# Patient Record
Sex: MALE | Race: WHITE | NOT HISPANIC OR LATINO | URBAN - METROPOLITAN AREA
[De-identification: names, ages, dates, MRNs, and addresses within clinical notes are randomized per-mention and may not be internally consistent; named-entity substitution may affect disease eponyms.]

---

## 2021-01-08 ENCOUNTER — INPATIENT (INPATIENT)
Facility: HOSPITAL | Age: 66
LOS: 3 days | Discharge: LEFT BEFORE DISCHARGE | DRG: 177 | End: 2021-01-12
Attending: INTERNAL MEDICINE | Admitting: INTERNAL MEDICINE
Payer: COMMERCIAL

## 2021-01-08 VITALS
WEIGHT: 185.19 LBS | TEMPERATURE: 99 F | DIASTOLIC BLOOD PRESSURE: 98 MMHG | RESPIRATION RATE: 20 BRPM | OXYGEN SATURATION: 87 % | HEART RATE: 101 BPM | SYSTOLIC BLOOD PRESSURE: 161 MMHG

## 2021-01-08 DIAGNOSIS — Z29.9 ENCOUNTER FOR PROPHYLACTIC MEASURES, UNSPECIFIED: ICD-10-CM

## 2021-01-08 DIAGNOSIS — R09.02 HYPOXEMIA: ICD-10-CM

## 2021-01-08 DIAGNOSIS — U07.1 COVID-19: ICD-10-CM

## 2021-01-08 LAB
ALBUMIN SERPL ELPH-MCNC: 3.4 G/DL — LOW (ref 3.5–5)
ALP SERPL-CCNC: 40 U/L — SIGNIFICANT CHANGE UP (ref 40–120)
ALT FLD-CCNC: 44 U/L DA — SIGNIFICANT CHANGE UP (ref 10–60)
ANION GAP SERPL CALC-SCNC: 10 MMOL/L — SIGNIFICANT CHANGE UP (ref 5–17)
AST SERPL-CCNC: 34 U/L — SIGNIFICANT CHANGE UP (ref 10–40)
BASOPHILS # BLD AUTO: 0 K/UL — SIGNIFICANT CHANGE UP (ref 0–0.2)
BASOPHILS NFR BLD AUTO: 0 % — SIGNIFICANT CHANGE UP (ref 0–2)
BILIRUB SERPL-MCNC: 0.4 MG/DL — SIGNIFICANT CHANGE UP (ref 0.2–1.2)
BUN SERPL-MCNC: 11 MG/DL — SIGNIFICANT CHANGE UP (ref 7–18)
CALCIUM SERPL-MCNC: 8.8 MG/DL — SIGNIFICANT CHANGE UP (ref 8.4–10.5)
CHLORIDE SERPL-SCNC: 96 MMOL/L — SIGNIFICANT CHANGE UP (ref 96–108)
CO2 SERPL-SCNC: 29 MMOL/L — SIGNIFICANT CHANGE UP (ref 22–31)
CREAT SERPL-MCNC: 0.97 MG/DL — SIGNIFICANT CHANGE UP (ref 0.5–1.3)
D DIMER BLD IA.RAPID-MCNC: 162 NG/ML DDU — SIGNIFICANT CHANGE UP
EOSINOPHIL # BLD AUTO: 0.01 K/UL — SIGNIFICANT CHANGE UP (ref 0–0.5)
EOSINOPHIL NFR BLD AUTO: 0.2 % — SIGNIFICANT CHANGE UP (ref 0–6)
GLUCOSE SERPL-MCNC: 113 MG/DL — HIGH (ref 70–99)
HCT VFR BLD CALC: 42 % — SIGNIFICANT CHANGE UP (ref 39–50)
HGB BLD-MCNC: 14 G/DL — SIGNIFICANT CHANGE UP (ref 13–17)
IMM GRANULOCYTES NFR BLD AUTO: 0.2 % — SIGNIFICANT CHANGE UP (ref 0–1.5)
LYMPHOCYTES # BLD AUTO: 0.78 K/UL — LOW (ref 1–3.3)
LYMPHOCYTES # BLD AUTO: 19 % — SIGNIFICANT CHANGE UP (ref 13–44)
MCHC RBC-ENTMCNC: 30.6 PG — SIGNIFICANT CHANGE UP (ref 27–34)
MCHC RBC-ENTMCNC: 33.3 GM/DL — SIGNIFICANT CHANGE UP (ref 32–36)
MCV RBC AUTO: 91.9 FL — SIGNIFICANT CHANGE UP (ref 80–100)
MONOCYTES # BLD AUTO: 0.47 K/UL — SIGNIFICANT CHANGE UP (ref 0–0.9)
MONOCYTES NFR BLD AUTO: 11.4 % — SIGNIFICANT CHANGE UP (ref 2–14)
NEUTROPHILS # BLD AUTO: 2.84 K/UL — SIGNIFICANT CHANGE UP (ref 1.8–7.4)
NEUTROPHILS NFR BLD AUTO: 69.2 % — SIGNIFICANT CHANGE UP (ref 43–77)
NRBC # BLD: 0 /100 WBCS — SIGNIFICANT CHANGE UP (ref 0–0)
PLATELET # BLD AUTO: 168 K/UL — SIGNIFICANT CHANGE UP (ref 150–400)
POTASSIUM SERPL-MCNC: 3.7 MMOL/L — SIGNIFICANT CHANGE UP (ref 3.5–5.3)
POTASSIUM SERPL-SCNC: 3.7 MMOL/L — SIGNIFICANT CHANGE UP (ref 3.5–5.3)
PROT SERPL-MCNC: 8 G/DL — SIGNIFICANT CHANGE UP (ref 6–8.3)
RBC # BLD: 4.57 M/UL — SIGNIFICANT CHANGE UP (ref 4.2–5.8)
RBC # FLD: 12.4 % — SIGNIFICANT CHANGE UP (ref 10.3–14.5)
SARS-COV-2 RNA SPEC QL NAA+PROBE: DETECTED
SODIUM SERPL-SCNC: 135 MMOL/L — SIGNIFICANT CHANGE UP (ref 135–145)
TROPONIN I SERPL-MCNC: <0.015 NG/ML — SIGNIFICANT CHANGE UP (ref 0–0.04)
WBC # BLD: 4.11 K/UL — SIGNIFICANT CHANGE UP (ref 3.8–10.5)
WBC # FLD AUTO: 4.11 K/UL — SIGNIFICANT CHANGE UP (ref 3.8–10.5)

## 2021-01-08 PROCEDURE — 71045 X-RAY EXAM CHEST 1 VIEW: CPT | Mod: 26

## 2021-01-08 PROCEDURE — 93010 ELECTROCARDIOGRAM REPORT: CPT

## 2021-01-08 PROCEDURE — 99284 EMERGENCY DEPT VISIT MOD MDM: CPT

## 2021-01-08 RX ORDER — ACETAMINOPHEN 500 MG
650 TABLET ORAL EVERY 4 HOURS
Refills: 0 | Status: DISCONTINUED | OUTPATIENT
Start: 2021-01-08 | End: 2021-01-12

## 2021-01-08 RX ORDER — IPRATROPIUM/ALBUTEROL SULFATE 18-103MCG
3 AEROSOL WITH ADAPTER (GRAM) INHALATION EVERY 6 HOURS
Refills: 0 | Status: DISCONTINUED | OUTPATIENT
Start: 2021-01-08 | End: 2021-01-09

## 2021-01-08 RX ORDER — GUAIFENESIN/DEXTROMETHORPHAN 600MG-30MG
10 TABLET, EXTENDED RELEASE 12 HR ORAL EVERY 4 HOURS
Refills: 0 | Status: DISCONTINUED | OUTPATIENT
Start: 2021-01-08 | End: 2021-01-12

## 2021-01-08 RX ORDER — DEXAMETHASONE 0.5 MG/5ML
6 ELIXIR ORAL ONCE
Refills: 0 | Status: COMPLETED | OUTPATIENT
Start: 2021-01-08 | End: 2021-01-08

## 2021-01-08 RX ORDER — PANTOPRAZOLE SODIUM 20 MG/1
40 TABLET, DELAYED RELEASE ORAL
Refills: 0 | Status: DISCONTINUED | OUTPATIENT
Start: 2021-01-08 | End: 2021-01-12

## 2021-01-08 RX ORDER — ENOXAPARIN SODIUM 100 MG/ML
40 INJECTION SUBCUTANEOUS DAILY
Refills: 0 | Status: DISCONTINUED | OUTPATIENT
Start: 2021-01-08 | End: 2021-01-12

## 2021-01-08 RX ORDER — AMLODIPINE BESYLATE 2.5 MG/1
5 TABLET ORAL DAILY
Refills: 0 | Status: DISCONTINUED | OUTPATIENT
Start: 2021-01-08 | End: 2021-01-12

## 2021-01-08 RX ORDER — TIOTROPIUM BROMIDE 18 UG/1
1 CAPSULE ORAL; RESPIRATORY (INHALATION) DAILY
Refills: 0 | Status: DISCONTINUED | OUTPATIENT
Start: 2021-01-08 | End: 2021-01-12

## 2021-01-08 RX ORDER — ALBUTEROL 90 UG/1
2 AEROSOL, METERED ORAL EVERY 4 HOURS
Refills: 0 | Status: DISCONTINUED | OUTPATIENT
Start: 2021-01-08 | End: 2021-01-12

## 2021-01-08 RX ORDER — DEXAMETHASONE 0.5 MG/5ML
6 ELIXIR ORAL DAILY
Refills: 0 | Status: DISCONTINUED | OUTPATIENT
Start: 2021-01-08 | End: 2021-01-12

## 2021-01-08 RX ADMIN — Medication 6 MILLIGRAM(S): at 17:47

## 2021-01-08 RX ADMIN — AMLODIPINE BESYLATE 5 MILLIGRAM(S): 2.5 TABLET ORAL at 21:37

## 2021-01-08 NOTE — H&P ADULT - ATTENDING COMMENTS
PATIENT SEEN AND EXAMINED. CASE D/W ER MD AND RESIDENT TEAM. ABOVE ROS/VS/PE REVIEWED AND VERIFIED,    HPI:    Patient is 65 year old male from home with no PMHx presenting to the ED with worsening shortness of breath for the last 4-5 days. He reports that today he went to an urgent care and was tested positive for COVID-19. Patient with worsening dyspnea on exertion and states that the shortness of breath has worsened NOW. pt on NR in ed and confortable. Pt denies and nausea, vomiting diarrhoea, chest pain, difficulty breathing.    # ACUTE HYPOXIC RESPIRATORY FAILURE S/T COVID19 VIRAL PNEUMONIA - PLACED ON SUPPLEMENTAL OXYGEN VIA NRB. F/U COVID19 IGG, F/U INFLAMMATORY MARKERS, PLACED ON DECADRON, THERAPEUTIC LOVENOX, AIRBORNE/DROPLET PRECAUTIONS  - F/U PROCAL - PRIOR TO STARTING ABX  - PULMONOLOGY CONSULT TO BE PLACED  # GI AND DVT PPX    RAMÍREZ CLARK MD COVERING LILY CLARK MD PATIENT SEEN AND EXAMINED. CASE D/W ER MD AND RESIDENT TEAM. ABOVE ROS/VS/PE REVIEWED AND VERIFIED,    HPI:    Patient is 65 year old male from home with no PMHx presenting to the ED with worsening shortness of breath for the last 4-5 days. He reports that today he went to an urgent care and was tested positive for COVID-19. Patient with worsening dyspnea on exertion and states that the shortness of breath has worsened NOW. pt on NR in ed and confortable. Pt denies and nausea, vomiting diarrhoea, chest pain, difficulty breathing.    # ACUTE HYPOXIC RESPIRATORY FAILURE S/T COVID19 VIRAL PNEUMONIA - PLACED ON SUPPLEMENTAL OXYGEN VIA NRB. F/U COVID19 IGG, F/U INFLAMMATORY MARKERS, PLACED ON DECADRON, THERAPEUTIC LOVENOX, TYLENOL, ROBITUSSIN, ALBUTEROL MDI, AIRBORNE/DROPLET PRECAUTIONS  - F/U PROCAL - PRIOR TO STARTING ABX  - PULMONOLOGY CONSULT TO BE PLACED  # GI AND DVT PPX    RAMÍREZ CLARK MD COVERING LILY CLARK MD

## 2021-01-08 NOTE — ED ADULT NURSE NOTE - ED STAT RN HANDOFF DETAILS
No sign of acute distress noted. Safety precaution maintained. Pt is transferred to  in stable condition.

## 2021-01-08 NOTE — ED PROVIDER NOTE - CLINICAL SUMMARY MEDICAL DECISION MAKING FREE TEXT BOX
65 year old male presenting to the ED with worsening shortness of breath. Concern for COVID-19 pneumonia with hypoxia. In need of supplemental O2. Will order labs, steroids, and reassess for admission.

## 2021-01-08 NOTE — H&P ADULT - PROBLEM SELECTOR PLAN 1
Likely Severe COVID Infection:  acute hypoxic respiratory failure 2/2 covid  -SIRS Criteria= 2 (, tachycardia and tachypnea) + covid source of infection  -Chest xray=  lungs show subtle/peripheral/RIGHT greater than LEFT ill-defined airspace disease. No pneumothorax  -on exam mild-to-moderate distress, tachypneic and requiring supplemental oxygen.  Therefore, patient is categorized as Severe Disease.  -Will start patient on therapeutic Lovenox  - will continue patient on Dexamethasone 6mg IV daily for 10 days or until discharge (whichever is shorter)  - Duonebs Q6H LAWRENCE as well as Albuterol MDI Q2H PRN  -Will hold antimicrobials, for now, though will still send ESR, CRP, Procalcitonin  - follow d-dimer, CRP, LDH, Troponin, Ferritin, CPK  -Robitussin 100mg Syrup Q6H PRN  -Tylenol PRN for fever  - supportive care as necessary  - will hold Remdesivir for now, f/u covid antibodies

## 2021-01-08 NOTE — ED ADULT NURSE NOTE - OBJECTIVE STATEMENT
presents with c/o shortness of breath on/off cough no fever/chills noted. presents with c/o shortness of breath onset 5days  on/off cough no fever/chills noted. stated tested Covid + today

## 2021-01-08 NOTE — ED PROVIDER NOTE - RESPIRATORY, MLM
O2 saturation at 87% on RA but goes up to 99 with 10L of O2. Breath sounds clear and equal bilaterally.

## 2021-01-08 NOTE — ED PROVIDER NOTE - OBJECTIVE STATEMENT
65 year old male denies any PMHx presenting to the ED with worsening shortness of breath for the last 4-5 days. She reports that today she went to an urgent care and was tested positive for COVID-19. Patient with worsening dyspnea on exertion and states that the shortness of breath has worsened.

## 2021-01-08 NOTE — H&P ADULT - NSHPPHYSICALEXAM_GEN_ALL_CORE
PHYSICAL EXAM:  GENERAL: NAD, speaks in full sentences, on NR  HEAD:  Atraumatic, Normocephalic  EYES: EOMI, PERRLA, conjunctiva and sclera clear  NECK: Supple, No JVD  CHEST/LUNG: DECREASED BREATH SOUNDS ON auscultation bilaterally; No wheeze; No crackles; No accessory muscles used  HEART: Regular rate and rhythm; No murmurs;   ABDOMEN: Soft, Nontender, Nondistended; Bowel sounds present; No guarding  EXTREMITIES:  2+ Peripheral Pulses, No cyanosis or edema  PSYCH: AAOx3  NEUROLOGY: no-focal DEFICIT  SKIN: No rashes or lesions

## 2021-01-08 NOTE — H&P ADULT - HISTORY OF PRESENT ILLNESS
65 year old male denies any PMHx presenting to the ED with worsening shortness of breath for the last 4-5 days. She reports that today she went to an urgent care and was tested positive for COVID-19. Patient with worsening dyspnea on exertion and states that the shortness of breath has worsened. Patient is 65 year old male from home with no PMHx presenting to the ED with worsening shortness of breath for the last 4-5 days. He reports that today he went to an urgent care and was tested positive for COVID-19. Patient with worsening dyspnea on exertion and states that the shortness of breath has worsened NOW. pt on NR in ed and confortable. Pt denies and nausea, vomiting diarrhoea, chest pain, difficulty breathing.

## 2021-01-08 NOTE — H&P ADULT - ASSESSMENT
Patient is 65 year old male from home with no PMHx presenting to the ED with worsening shortness of breath for the last 4-5 days. He reports that today he went to an urgent care and was tested positive for COVID-19.  aDMITTED FOR acute hypoxic respiratory failure

## 2021-01-09 DIAGNOSIS — I10 ESSENTIAL (PRIMARY) HYPERTENSION: ICD-10-CM

## 2021-01-09 LAB
A1C WITH ESTIMATED AVERAGE GLUCOSE RESULT: 5.8 % — HIGH (ref 4–5.6)
ALBUMIN SERPL ELPH-MCNC: 3.3 G/DL — LOW (ref 3.5–5)
ALP SERPL-CCNC: 39 U/L — LOW (ref 40–120)
ALT FLD-CCNC: 48 U/L DA — SIGNIFICANT CHANGE UP (ref 10–60)
AMMONIA BLD-MCNC: 37 UMOL/L — HIGH (ref 11–32)
ANION GAP SERPL CALC-SCNC: 9 MMOL/L — SIGNIFICANT CHANGE UP (ref 5–17)
AST SERPL-CCNC: 37 U/L — SIGNIFICANT CHANGE UP (ref 10–40)
BASOPHILS # BLD AUTO: 0 K/UL — SIGNIFICANT CHANGE UP (ref 0–0.2)
BASOPHILS NFR BLD AUTO: 0 % — SIGNIFICANT CHANGE UP (ref 0–2)
BILIRUB SERPL-MCNC: 0.3 MG/DL — SIGNIFICANT CHANGE UP (ref 0.2–1.2)
BUN SERPL-MCNC: 18 MG/DL — SIGNIFICANT CHANGE UP (ref 7–18)
CALCIUM SERPL-MCNC: 9.1 MG/DL — SIGNIFICANT CHANGE UP (ref 8.4–10.5)
CHLORIDE SERPL-SCNC: 103 MMOL/L — SIGNIFICANT CHANGE UP (ref 96–108)
CHOLEST SERPL-MCNC: 126 MG/DL — SIGNIFICANT CHANGE UP
CO2 SERPL-SCNC: 27 MMOL/L — SIGNIFICANT CHANGE UP (ref 22–31)
CREAT SERPL-MCNC: 0.76 MG/DL — SIGNIFICANT CHANGE UP (ref 0.5–1.3)
CRP SERPL-MCNC: 9.21 MG/DL — HIGH (ref 0–0.4)
CRP SERPL-MCNC: 9.61 MG/DL — HIGH (ref 0–0.4)
D DIMER BLD IA.RAPID-MCNC: 153 NG/ML DDU — SIGNIFICANT CHANGE UP
EOSINOPHIL # BLD AUTO: 0 K/UL — SIGNIFICANT CHANGE UP (ref 0–0.5)
EOSINOPHIL NFR BLD AUTO: 0 % — SIGNIFICANT CHANGE UP (ref 0–6)
ERYTHROCYTE [SEDIMENTATION RATE] IN BLOOD: 75 MM/HR — HIGH (ref 0–20)
ESTIMATED AVERAGE GLUCOSE: 120 MG/DL — HIGH (ref 68–114)
ETHANOL SERPL-MCNC: <3 MG/DL — SIGNIFICANT CHANGE UP (ref 0–10)
FERRITIN SERPL-MCNC: 312 NG/ML — SIGNIFICANT CHANGE UP (ref 30–400)
FERRITIN SERPL-MCNC: 350 NG/ML — SIGNIFICANT CHANGE UP (ref 30–400)
FOLATE SERPL-MCNC: 19.7 NG/ML — SIGNIFICANT CHANGE UP
GLUCOSE SERPL-MCNC: 137 MG/DL — HIGH (ref 70–99)
HCT VFR BLD CALC: 41.8 % — SIGNIFICANT CHANGE UP (ref 39–50)
HCV AB S/CO SERPL IA: 0.08 S/CO — SIGNIFICANT CHANGE UP (ref 0–0.99)
HCV AB SERPL-IMP: SIGNIFICANT CHANGE UP
HDLC SERPL-MCNC: 37 MG/DL — LOW
HGB BLD-MCNC: 14.1 G/DL — SIGNIFICANT CHANGE UP (ref 13–17)
IRON SATN MFR SERPL: 15 % — LOW (ref 20–55)
IRON SATN MFR SERPL: 39 UG/DL — LOW (ref 65–170)
LDH SERPL L TO P-CCNC: 314 U/L — HIGH (ref 120–225)
LIDOCAIN IGE QN: 269 U/L — SIGNIFICANT CHANGE UP (ref 73–393)
LIPID PNL WITH DIRECT LDL SERPL: 68 MG/DL — SIGNIFICANT CHANGE UP
LYMPHOCYTES # BLD AUTO: 0.58 K/UL — LOW (ref 1–3.3)
LYMPHOCYTES # BLD AUTO: 23 % — SIGNIFICANT CHANGE UP (ref 13–44)
MAGNESIUM SERPL-MCNC: 2.4 MG/DL — SIGNIFICANT CHANGE UP (ref 1.6–2.6)
MANUAL SMEAR VERIFICATION: SIGNIFICANT CHANGE UP
MCHC RBC-ENTMCNC: 30.9 PG — SIGNIFICANT CHANGE UP (ref 27–34)
MCHC RBC-ENTMCNC: 33.7 GM/DL — SIGNIFICANT CHANGE UP (ref 32–36)
MCV RBC AUTO: 91.5 FL — SIGNIFICANT CHANGE UP (ref 80–100)
MONOCYTES # BLD AUTO: 0.23 K/UL — SIGNIFICANT CHANGE UP (ref 0–0.9)
MONOCYTES NFR BLD AUTO: 9 % — SIGNIFICANT CHANGE UP (ref 2–14)
NEUTROPHILS # BLD AUTO: 1.71 K/UL — LOW (ref 1.8–7.4)
NEUTROPHILS NFR BLD AUTO: 68 % — SIGNIFICANT CHANGE UP (ref 43–77)
NON HDL CHOLESTEROL: 89 MG/DL — SIGNIFICANT CHANGE UP
NRBC # BLD: 0 /100 — SIGNIFICANT CHANGE UP (ref 0–0)
PHOSPHATE SERPL-MCNC: 3.9 MG/DL — SIGNIFICANT CHANGE UP (ref 2.5–4.5)
PLAT MORPH BLD: NORMAL — SIGNIFICANT CHANGE UP
PLATELET # BLD AUTO: 158 K/UL — SIGNIFICANT CHANGE UP (ref 150–400)
POTASSIUM SERPL-MCNC: 3.9 MMOL/L — SIGNIFICANT CHANGE UP (ref 3.5–5.3)
POTASSIUM SERPL-SCNC: 3.9 MMOL/L — SIGNIFICANT CHANGE UP (ref 3.5–5.3)
PROCALCITONIN SERPL-MCNC: 0.14 NG/ML — HIGH (ref 0.02–0.1)
PROCALCITONIN SERPL-MCNC: 0.14 NG/ML — HIGH (ref 0.02–0.1)
PROT SERPL-MCNC: 8.1 G/DL — SIGNIFICANT CHANGE UP (ref 6–8.3)
RBC # BLD: 4.57 M/UL — SIGNIFICANT CHANGE UP (ref 4.2–5.8)
RBC # BLD: 4.57 M/UL — SIGNIFICANT CHANGE UP (ref 4.2–5.8)
RBC # FLD: 12.3 % — SIGNIFICANT CHANGE UP (ref 10.3–14.5)
RBC BLD AUTO: NORMAL — SIGNIFICANT CHANGE UP
RETICS #: 21.9 K/UL — LOW (ref 25–125)
RETICS/RBC NFR: 0.5 % — SIGNIFICANT CHANGE UP (ref 0.5–2.5)
SODIUM SERPL-SCNC: 139 MMOL/L — SIGNIFICANT CHANGE UP (ref 135–145)
T4 AB SER-ACNC: 10.3 UG/DL — SIGNIFICANT CHANGE UP (ref 4.6–12)
TIBC SERPL-MCNC: 262 UG/DL — SIGNIFICANT CHANGE UP (ref 250–450)
TRIGL SERPL-MCNC: 106 MG/DL — SIGNIFICANT CHANGE UP
TROPONIN I SERPL-MCNC: <0.015 NG/ML — SIGNIFICANT CHANGE UP (ref 0–0.04)
TSH SERPL-MCNC: 0.4 UU/ML — SIGNIFICANT CHANGE UP (ref 0.34–4.82)
UIBC SERPL-MCNC: 223 UG/DL — SIGNIFICANT CHANGE UP (ref 110–370)
URATE SERPL-MCNC: 4.1 MG/DL — SIGNIFICANT CHANGE UP (ref 3.4–8.8)
VIT B12 SERPL-MCNC: 506 PG/ML — SIGNIFICANT CHANGE UP (ref 232–1245)
WBC # BLD: 2.52 K/UL — LOW (ref 3.8–10.5)
WBC # FLD AUTO: 2.52 K/UL — LOW (ref 3.8–10.5)

## 2021-01-09 RX ORDER — ASPIRIN/CALCIUM CARB/MAGNESIUM 324 MG
81 TABLET ORAL DAILY
Refills: 0 | Status: DISCONTINUED | OUTPATIENT
Start: 2021-01-09 | End: 2021-01-12

## 2021-01-09 RX ORDER — FERROUS SULFATE 325(65) MG
325 TABLET ORAL DAILY
Refills: 0 | Status: DISCONTINUED | OUTPATIENT
Start: 2021-01-09 | End: 2021-01-12

## 2021-01-09 RX ADMIN — PANTOPRAZOLE SODIUM 40 MILLIGRAM(S): 20 TABLET, DELAYED RELEASE ORAL at 06:13

## 2021-01-09 RX ADMIN — Medication 325 MILLIGRAM(S): at 13:17

## 2021-01-09 RX ADMIN — Medication 6 MILLIGRAM(S): at 06:13

## 2021-01-09 RX ADMIN — ENOXAPARIN SODIUM 40 MILLIGRAM(S): 100 INJECTION SUBCUTANEOUS at 13:16

## 2021-01-09 RX ADMIN — AMLODIPINE BESYLATE 5 MILLIGRAM(S): 2.5 TABLET ORAL at 06:13

## 2021-01-09 NOTE — PROGRESS NOTE ADULT - PROBLEM SELECTOR PLAN 2
RISK                                                          Points  [] Previous VTE                                           3  [] Thrombophilia                                        2  [] Lower limb paralysis                              2   [] Current Cancer                                       2   [x] Immobilization > 24 hrs                        1  [] ICU/CCU stay > 24 hours                       1  [x] Age > 60                                                   1  DVT ppx: Subq Lovenox  GI ppx: Protonix  Diet: Regular  Electrolytes replaced PRN  Dispo: Home  FULL CODE Started on amlodipine for elevated BPs  Monitor BP titrate meds prn

## 2021-01-09 NOTE — PROGRESS NOTE ADULT - PROBLEM SELECTOR PLAN 3
RISK                                                          Points  [] Previous VTE                                           3  [] Thrombophilia                                        2  [] Lower limb paralysis                              2   [] Current Cancer                                       2   [x] Immobilization > 24 hrs                        1  [] ICU/CCU stay > 24 hours                       1  [x] Age > 60                                                   1  DVT ppx: Subq Lovenox  GI ppx: Protonix  Diet: Regular  Electrolytes replaced PRN  Dispo: Home  FULL CODE

## 2021-01-09 NOTE — PROGRESS NOTE ADULT - SUBJECTIVE AND OBJECTIVE BOX
Patient is a 65y old  Male who presents with a chief complaint of acute hypoxic respiratory failure (2021 20:54)    PATIENT IS SEEN AND EXAMINED IN MEDICAL FLOOR.  MAKENZIE [    ]    MORRIS [   ]      GT [   ]    ALLERGIES:  No Known Drug Allergies  salicylates causes rash (Rash)      Daily     Daily Weight in k.3 (2021 00:02)    VITALS:    Vital Signs Last 24 Hrs  T(C): 36.2 (2021 05:36), Max: 37 (2021 17:02)  T(F): 97.2 (2021 05:36), Max: 98.6 (2021 17:02)  HR: 75 (2021 05:36) (75 - 101)  BP: 154/78 (2021 05:36) (136/77 - 161/98)  BP(mean): --  RR: 16 (2021 05:36) (16 - 20)  SpO2: 96% (2021 05:36) (87% - 96%)    LABS:    CBC Full  -  ( 2021 08:22 )  WBC Count : 2.52 K/uL  RBC Count : 4.57 M/uL  Hemoglobin : 14.1 g/dL  Hematocrit : 41.8 %  Platelet Count - Automated : 158 K/uL  Mean Cell Volume : 91.5 fl  Mean Cell Hemoglobin : 30.9 pg  Mean Cell Hemoglobin Concentration : 33.7 gm/dL  Auto Neutrophil # : 1.71 K/uL  Auto Lymphocyte # : 0.58 K/uL  Auto Monocyte # : 0.23 K/uL  Auto Eosinophil # : 0.00 K/uL  Auto Basophil # : 0.00 K/uL  Auto Neutrophil % : 68.0 %  Auto Lymphocyte % : 23.0 %  Auto Monocyte % : 9.0 %  Auto Eosinophil % : 0.0 %  Auto Basophil % : 0.0 %      -09    139  |  103  |  18  ----------------------------<  137<H>  3.9   |  27  |  0.76    Ca    9.1      2021 08:22  Phos  3.9     01-09  Mg     2.4     01-09    TPro  8.1  /  Alb  3.3<L>  /  TBili  0.3  /  DBili  x   /  AST  37  /  ALT  48  /  AlkPhos  39<L>  01-09    CAPILLARY BLOOD GLUCOSE        CARDIAC MARKERS ( 2021 08:22 )  <0.015 ng/mL / x     / x     / x     / x      CARDIAC MARKERS ( 2021 18:01 )  <0.015 ng/mL / x     / x     / x     / x          LIVER FUNCTIONS - ( 2021 08:22 )  Alb: 3.3 g/dL / Pro: 8.1 g/dL / ALK PHOS: 39 U/L / ALT: 48 U/L DA / AST: 37 U/L / GGT: x           Creatinine Trend: 0.76<--, 0.97<--  I&O's Summary              MEDICATIONS:    MEDICATIONS  (STANDING):  albuterol/ipratropium for Nebulization 3 milliLiter(s) Nebulizer every 6 hours  amLODIPine   Tablet 5 milliGRAM(s) Oral daily  dexAMETHasone     Tablet 6 milliGRAM(s) Oral daily  enoxaparin Injectable 40 milliGRAM(s) SubCutaneous daily  ferrous    sulfate 325 milliGRAM(s) Oral daily  pantoprazole    Tablet 40 milliGRAM(s) Oral before breakfast  tiotropium 18 MICROgram(s) Capsule 1 Capsule(s) Inhalation daily      MEDICATIONS  (PRN):  acetaminophen   Tablet .. 650 milliGRAM(s) Oral every 4 hours PRN Temp greater or equal to 38.5C (101.3F)  ALBUTerol    90 MICROgram(s) HFA Inhaler 2 Puff(s) Inhalation every 4 hours PRN Shortness of Breath and/or Wheezing  benzonatate 100 milliGRAM(s) Oral three times a day PRN Cough  guaifenesin/dextromethorphan  Syrup 10 milliLiter(s) Oral every 4 hours PRN Cough      REVIEW OF SYSTEMS:                           ALL ROS DONE [ X   ]    CONSTITUTIONAL:  LETHARGIC [   ], FEVER [   ], UNRESPONSIVE [   ]  CVS:  CP  [   ], SOB, [   ], PALPITATIONS [   ], DIZZYNESS [   ]  RS: COUGH [   ], SPUTUM [   ]  GI: ABDOMINAL PAIN [   ], NAUSEA [   ], VOMITINGS [   ], DIARRHEA [   ], CONSTIPATION [   ]  :  DYSURIA [   ], NOCTURIA [   ], INCREASED FREQUENCY [   ], DRIBLING [   ],  SKELETAL: PAINFUL JOINTS [   ], SWOLLEN JOINTS [   ], NECK ACHE [   ], LOW BACK ACHE [   ],  SKIN : ULCERS [   ], RASH [   ], ITCHING [   ]  CNS: HEAD ACHE [   ], DOUBLE VISION [   ], BLURRED VISION [   ], AMS / CONFUSION [   ], SEIZURES [   ], WEAKNESS [   ],TINGLING / NUMBNESS [   ]    PHYSICAL EXAMINATION:  GENERAL APPEARANCE: NO DISTRESS  HEENT:  NO PALLOR, NO  JVD,  NO   NODES, NECK SUPPLE  CVS: S1 +, S2 +,   RS: AEEB,  OCCASIONAL  RALES +,   NO RONCHI  ABD: SOFT, NT, NO, BS +  EXT: NO PE  SKIN: WARM,   SKELETAL:  ROM ACCEPTABLE  CNS:  AAO X 3 ,   DEFICITS    RADIOLOGY :    EXAM:  XR CHEST PORTABLE IMMED 1V                            PROCEDURE DATE:  2021          INTERPRETATION:  Portable chest radiograph    CLINICAL INFORMATION: Dyspnea, shortness of breath.    TECHNIQUE:  Portable  AP view of the chest was obtained.    COMPARISON: No previous examinations are available for review.    FINDINGS:  The lungs show subtle/peripheral/RIGHT greater than LEFT ill-defined airspace disease. No pneumothorax.    The heart and mediastinum are within normal limits.    Visualized osseous structures are intact.        IMPRESSION: Subtle Ill-defined RIGHT greater than LEFT peripheral airspace disease..      ASSESSMENT :     Hypoxemia    COVID-19      PLAN:  HPI:  Patient is 65 year old male from home with no PMHx presenting to the ED with worsening shortness of breath for the last 4-5 days. He reports that today he went to an urgent care and was tested positive for COVID-19. Patient with worsening dyspnea on exertion and states that the shortness of breath has worsened NOW. pt on NR in ed and confortable. Pt denies and nausea, vomiting diarrhoea, chest pain, difficulty breathing. (2021 20:54)    # ACUTE HYPOXIC RESPIRATORY FAILURE S/T COVID19 VIRAL PNEUMONIA - PLACED ON SUPPLEMENTAL OXYGEN VIA NRB. F/U COVID19 IGG, F/U INFLAMMATORY MARKERS, PLACED ON DECADRON, THERAPEUTIC LOVENOX, TYLENOL, ROBITUSSIN, ALBUTEROL MDI, AIRBORNE/DROPLET PRECAUTIONS  - F/U PROCAL - PRIOR TO STARTING ABX  - PULMONOLOGY CONSULT TO BE PLACED  # GI AND DVT PPX    RAMÍREZ CLARK MD COVERING LILY CLARK MD.        Patient is a 65y old  Male who presents with a chief complaint of acute hypoxic respiratory failure (2021 20:54)    PATIENT IS SEEN AND EXAMINED IN MEDICAL FLOOR.    ALLERGIES:  No Known Drug Allergies  salicylates causes rash (Rash)      Daily     Daily Weight in k.3 (2021 00:02)    VITALS:    Vital Signs Last 24 Hrs  T(C): 36.2 (2021 05:36), Max: 37 (2021 17:02)  T(F): 97.2 (2021 05:36), Max: 98.6 (2021 17:02)  HR: 75 (2021 05:36) (75 - 101)  BP: 154/78 (2021 05:36) (136/77 - 161/98)  BP(mean): --  RR: 16 (2021 05:36) (16 - 20)  SpO2: 96% (2021 05:36) (87% - 96%)    LABS:    CBC Full  -  ( 2021 08:22 )  WBC Count : 2.52 K/uL  RBC Count : 4.57 M/uL  Hemoglobin : 14.1 g/dL  Hematocrit : 41.8 %  Platelet Count - Automated : 158 K/uL  Mean Cell Volume : 91.5 fl  Mean Cell Hemoglobin : 30.9 pg  Mean Cell Hemoglobin Concentration : 33.7 gm/dL  Auto Neutrophil # : 1.71 K/uL  Auto Lymphocyte # : 0.58 K/uL  Auto Monocyte # : 0.23 K/uL  Auto Eosinophil # : 0.00 K/uL  Auto Basophil # : 0.00 K/uL  Auto Neutrophil % : 68.0 %  Auto Lymphocyte % : 23.0 %  Auto Monocyte % : 9.0 %  Auto Eosinophil % : 0.0 %  Auto Basophil % : 0.0 %          139  |  103  |  18  ----------------------------<  137<H>  3.9   |  27  |  0.76    Ca    9.1      2021 08:22  Phos  3.9     -  Mg     2.4     -09    TPro  8.1  /  Alb  3.3<L>  /  TBili  0.3  /  DBili  x   /  AST  37  /  ALT  48  /  AlkPhos  39<L>  01-09    CAPILLARY BLOOD GLUCOSE        CARDIAC MARKERS ( 2021 08:22 )  <0.015 ng/mL / x     / x     / x     / x      CARDIAC MARKERS ( 2021 18:01 )  <0.015 ng/mL / x     / x     / x     / x          LIVER FUNCTIONS - ( 2021 08:22 )  Alb: 3.3 g/dL / Pro: 8.1 g/dL / ALK PHOS: 39 U/L / ALT: 48 U/L DA / AST: 37 U/L / GGT: x           Creatinine Trend: 0.76<--, 0.97<--  I&O's Summary    MEDICATIONS:    MEDICATIONS  (STANDING):  albuterol/ipratropium for Nebulization 3 milliLiter(s) Nebulizer every 6 hours  amLODIPine   Tablet 5 milliGRAM(s) Oral daily  dexAMETHasone     Tablet 6 milliGRAM(s) Oral daily  enoxaparin Injectable 40 milliGRAM(s) SubCutaneous daily  ferrous    sulfate 325 milliGRAM(s) Oral daily  pantoprazole    Tablet 40 milliGRAM(s) Oral before breakfast  tiotropium 18 MICROgram(s) Capsule 1 Capsule(s) Inhalation daily      MEDICATIONS  (PRN):  acetaminophen   Tablet .. 650 milliGRAM(s) Oral every 4 hours PRN Temp greater or equal to 38.5C (101.3F)  ALBUTerol    90 MICROgram(s) HFA Inhaler 2 Puff(s) Inhalation every 4 hours PRN Shortness of Breath and/or Wheezing  benzonatate 100 milliGRAM(s) Oral three times a day PRN Cough  guaifenesin/dextromethorphan  Syrup 10 milliLiter(s) Oral every 4 hours PRN Cough      REVIEW OF SYSTEMS:                           ALL ROS DONE [ X   ]    CONSTITUTIONAL:  LETHARGIC [   ], FEVER [   ], UNRESPONSIVE [   ]  CVS:  CP  [   ], SOB, [   ], PALPITATIONS [   ], DIZZYNESS [   ]  RS: COUGH [   ], SPUTUM [   ]  GI: ABDOMINAL PAIN [   ], NAUSEA [   ], VOMITINGS [   ], DIARRHEA [   ], CONSTIPATION [   ]  :  DYSURIA [   ], NOCTURIA [   ], INCREASED FREQUENCY [   ], DRIBLING [   ],  SKELETAL: PAINFUL JOINTS [   ], SWOLLEN JOINTS [   ], NECK ACHE [   ], LOW BACK ACHE [   ],  SKIN : ULCERS [   ], RASH [   ], ITCHING [   ]  CNS: HEAD ACHE [   ], DOUBLE VISION [   ], BLURRED VISION [   ], AMS / CONFUSION [   ], SEIZURES [   ], WEAKNESS [   ],TINGLING / NUMBNESS [   ]    PHYSICAL EXAMINATION:  GENERAL APPEARANCE: NO DISTRESS  HEENT:  NO PALLOR, NO  JVD,  NO   NODES, NECK SUPPLE  CVS: S1 +, S2 +,   RS: AEEB,  OCCASIONAL  RALES +,   NO RONCHI  ABD: SOFT, NT, NO, BS +  EXT: NO PE  SKIN: WARM,   SKELETAL:  ROM ACCEPTABLE  CNS:  AAO X 3 ,   DEFICITS    RADIOLOGY :    EXAM:  XR CHEST PORTABLE IMMED 1V                            PROCEDURE DATE:  2021          INTERPRETATION:  Portable chest radiograph    CLINICAL INFORMATION: Dyspnea, shortness of breath.    TECHNIQUE:  Portable  AP view of the chest was obtained.    COMPARISON: No previous examinations are available for review.    FINDINGS:  The lungs show subtle/peripheral/RIGHT greater than LEFT ill-defined airspace disease. No pneumothorax.    The heart and mediastinum are within normal limits.    Visualized osseous structures are intact.        IMPRESSION: Subtle Ill-defined RIGHT greater than LEFT peripheral airspace disease..      ASSESSMENT :     Hypoxemia    COVID-19      PLAN:  HPI:  Patient is 65 year old male from home with no PMHx presenting to the ED with worsening shortness of breath for the last 4-5 days. He reports that today he went to an urgent care and was tested positive for COVID-19. Patient with worsening dyspnea on exertion and states that the shortness of breath has worsened NOW. pt on NR in ed and confortable. Pt denies and nausea, vomiting diarrhoea, chest pain, difficulty breathing. (2021 20:54)    # ACUTE HYPOXIC RESPIRATORY FAILURE S/T COVID19 VIRAL PNEUMONIA - PLACED ON SUPPLEMENTAL OXYGEN VIA NC. F/U COVID19 IGG, REVIEWED INFLAMMATORY MARKERS, PLACED ON DECADRON, DC THERAPEUTIC LOVENOX, TYLENOL, ROBITUSSIN, ALBUTEROL MDI, AIRBORNE/DROPLET PRECAUTIONS  - WILL SWITCH TO ASA 81   - NOTED HYPOXIA ON AMBULATION/ON ROOM AIR - WILL NEED HOME O2 - CM TEAM TO ARRANGE  - PULMONOLOGY CONSULT TO BE PLACED  # GI AND DVT PPX    RAMÍREZ CLARK MD COVERING LILY CLARK MD.

## 2021-01-09 NOTE — PROGRESS NOTE ADULT - SUBJECTIVE AND OBJECTIVE BOX
PGY 1 Note discussed with supervising resident and primary attending  PGY-1: Brenda Arguelles MD  PAGER #: [phone] TILL 5:00 PM  Please contact On Call team 5PM - 8:30PM  Please contact Nightfloat team 8:30PM - 7:30AM  Patient is a 65y old  Male who presents with a chief complaint of acute hypoxic respiratory failure (09 Jan 2021 12:10)    Brief Hospital Course:     INTERVAL HPI/OVERNIGHT EVENTS: No acute events noted overnight.    MEDICATIONS  (STANDING):  albuterol/ipratropium for Nebulization 3 milliLiter(s) Nebulizer every 6 hours  amLODIPine   Tablet 5 milliGRAM(s) Oral daily  dexAMETHasone     Tablet 6 milliGRAM(s) Oral daily  enoxaparin Injectable 40 milliGRAM(s) SubCutaneous daily  ferrous    sulfate 325 milliGRAM(s) Oral daily  pantoprazole    Tablet 40 milliGRAM(s) Oral before breakfast  tiotropium 18 MICROgram(s) Capsule 1 Capsule(s) Inhalation daily    MEDICATIONS  (PRN):  acetaminophen   Tablet .. 650 milliGRAM(s) Oral every 4 hours PRN Temp greater or equal to 38.5C (101.3F)  ALBUTerol    90 MICROgram(s) HFA Inhaler 2 Puff(s) Inhalation every 4 hours PRN Shortness of Breath and/or Wheezing  benzonatate 100 milliGRAM(s) Oral three times a day PRN Cough  guaifenesin/dextromethorphan  Syrup 10 milliLiter(s) Oral every 4 hours PRN Cough      __________________________________________________  REVIEW OF SYSTEMS:  CONSTITUTIONAL: No fever, weight loss, or fatigue  RESPIRATORY: No cough, wheezing, chills or hemoptysis; No shortness of breath  CARDIOVASCULAR: No chest pain, palpitations, dizziness, or leg swelling  GASTROINTESTINAL: No abdominal pain. No nausea, vomiting, or hematemesis; No diarrhea or constipation. No melena or hematochezia.  GENITOURINARY: No dysuria or hematuria, no burning micturition, no polyuria, no urinary hesitancy, no nocturia, normal urinary frequency  NEUROLOGICAL: No headaches, memory loss, loss of strength, numbness, or tremors  SKIN: No itching, burning, rashes, or lesions   All other ROS negative except noted above    Vital Signs Last 24 Hrs  T(C): 36.2 (09 Jan 2021 05:36), Max: 37 (08 Jan 2021 17:02)  T(F): 97.2 (09 Jan 2021 05:36), Max: 98.6 (08 Jan 2021 17:02)  HR: 75 (09 Jan 2021 05:36) (75 - 101)  BP: 154/78 (09 Jan 2021 05:36) (136/77 - 161/98)  BP(mean): --  RR: 16 (09 Jan 2021 05:36) (16 - 20)  SpO2: 96% (09 Jan 2021 05:36) (87% - 96%)    ________________________________________________  PHYSICAL EXAMINATION:  GENERAL: NAD, well-developed  HEAD:  Atraumatic, Normocephalic  EYES:  conjunctiva and sclera clear  NECK: Supple, No JVD, Normal thyroid  CHEST/LUNG: Clear to auscultation bilaterally; No rales, rhonchi, wheezing, or rubs  HEART: Regular rate and rhythm; No murmurs, rubs, or gallops  ABDOMEN: Soft, Nontender, Nondistended; Bowel sounds present  NERVOUS SYSTEM:  Alert & Oriented X3,  Moving all 4 extremities  EXTREMITIES:  Peripheral pulses palpable. No clubbing, cyanosis, or edema  SKIN: Warm, Dry, no rashes or lesions    _________________________________________________  LABS:                        14.1   2.52  )-----------( 158      ( 09 Jan 2021 08:22 )             41.8     01-09    139  |  103  |  18  ----------------------------<  137<H>  3.9   |  27  |  0.76    Ca    9.1      09 Jan 2021 08:22  Phos  3.9     01-09  Mg     2.4     01-09    TPro  8.1  /  Alb  3.3<L>  /  TBili  0.3  /  DBili  x   /  AST  37  /  ALT  48  /  AlkPhos  39<L>  01-09        CAPILLARY BLOOD GLUCOSE            RADIOLOGY & ADDITIONAL TESTS:    Imaging Personally Reviewed:  YES    Consultant(s) Notes Reviewed:   YES    Care Discussed with Consultants : YES     Plan of care was discussed with patient and /or primary care giver; all questions and concerns were addressed and care was aligned with patient's wishes.     PGY 1 Note discussed with supervising resident and primary attending  PGY-1: Brenda Arguelles MD  PAGER #: [phone] TILL 5:00 PM  Please contact On Call team 5PM - 8:30PM  Please contact Nightfloat team 8:30PM - 7:30AM  Patient is a 65y old  Male who presents with a chief complaint of acute hypoxic respiratory failure (09 Jan 2021 12:10)    Brief Hospital Course: 65 year old male from home with no PMHx presenting to the ED with worsening shortness of breath for the last 4-5 days. He reports that today he went to an urgent care and was tested positive for COVID-19. Patient with worsening dyspnea on exertion and states that the shortness of breath has worsened NOW. pt on NR in ed and confortable. Pt denies and nausea, vomiting diarrhoea, chest pain, difficulty breathing.    INTERVAL HPI/OVERNIGHT EVENTS: No acute events noted overnight. Pt saturating well on RA. No acute complaints. Ambulating, tolerating diet.     MEDICATIONS  (STANDING):  albuterol/ipratropium for Nebulization 3 milliLiter(s) Nebulizer every 6 hours  amLODIPine   Tablet 5 milliGRAM(s) Oral daily  dexAMETHasone     Tablet 6 milliGRAM(s) Oral daily  enoxaparin Injectable 40 milliGRAM(s) SubCutaneous daily  ferrous    sulfate 325 milliGRAM(s) Oral daily  pantoprazole    Tablet 40 milliGRAM(s) Oral before breakfast  tiotropium 18 MICROgram(s) Capsule 1 Capsule(s) Inhalation daily    MEDICATIONS  (PRN):  acetaminophen   Tablet .. 650 milliGRAM(s) Oral every 4 hours PRN Temp greater or equal to 38.5C (101.3F)  ALBUTerol    90 MICROgram(s) HFA Inhaler 2 Puff(s) Inhalation every 4 hours PRN Shortness of Breath and/or Wheezing  benzonatate 100 milliGRAM(s) Oral three times a day PRN Cough  guaifenesin/dextromethorphan  Syrup 10 milliLiter(s) Oral every 4 hours PRN Cough      __________________________________________________  REVIEW OF SYSTEMS:  CONSTITUTIONAL: No fever, weight loss, or fatigue  RESPIRATORY: No cough, wheezing, chills or hemoptysis; No shortness of breath  CARDIOVASCULAR: No chest pain, palpitations, dizziness, or leg swelling  GASTROINTESTINAL: No abdominal pain. No nausea, vomiting, or hematemesis; No diarrhea or constipation. No melena or hematochezia.  GENITOURINARY: No dysuria or hematuria, no burning micturition, no polyuria, no urinary hesitancy, no nocturia, normal urinary frequency  NEUROLOGICAL: No headaches, memory loss, loss of strength, numbness, or tremors  SKIN: No itching, burning, rashes, or lesions   All other ROS negative except noted above    Vital Signs Last 24 Hrs  T(C): 36.2 (09 Jan 2021 05:36), Max: 37 (08 Jan 2021 17:02)  T(F): 97.2 (09 Jan 2021 05:36), Max: 98.6 (08 Jan 2021 17:02)  HR: 75 (09 Jan 2021 05:36) (75 - 101)  BP: 154/78 (09 Jan 2021 05:36) (136/77 - 161/98)  BP(mean): --  RR: 16 (09 Jan 2021 05:36) (16 - 20)  SpO2: 96% (09 Jan 2021 05:36) (87% - 96%)    ________________________________________________  PHYSICAL EXAMINATION:  GENERAL: NAD, well-developed  HEAD:  Atraumatic, Normocephalic  EYES:  conjunctiva and sclera clear  NECK: Supple, No JVD, Normal thyroid  CHEST/LUNG: Clear to auscultation bilaterally; No rales, rhonchi, wheezing, or rubs  HEART: Regular rate and rhythm; No murmurs, rubs, or gallops  ABDOMEN: Soft, Nontender, Nondistended; Bowel sounds present  NERVOUS SYSTEM:  Alert & Oriented X3,  Moving all 4 extremities  EXTREMITIES:  Peripheral pulses palpable. No clubbing, cyanosis, or edema  SKIN: Warm, Dry, no rashes or lesions    _________________________________________________  LABS:                        14.1   2.52  )-----------( 158      ( 09 Jan 2021 08:22 )             41.8     01-09    139  |  103  |  18  ----------------------------<  137<H>  3.9   |  27  |  0.76    Ca    9.1      09 Jan 2021 08:22  Phos  3.9     01-09  Mg     2.4     01-09    TPro  8.1  /  Alb  3.3<L>  /  TBili  0.3  /  DBili  x   /  AST  37  /  ALT  48  /  AlkPhos  39<L>  01-09        CAPILLARY BLOOD GLUCOSE            RADIOLOGY & ADDITIONAL TESTS:    Imaging Personally Reviewed:  YES    Consultant(s) Notes Reviewed:   YES    Care Discussed with Consultants : YES     Plan of care was discussed with patient and /or primary care giver; all questions and concerns were addressed and care was aligned with patient's wishes.

## 2021-01-09 NOTE — PROGRESS NOTE ADULT - PROBLEM SELECTOR PLAN 1
Saturating well on RA now  - Monitor O2 and titrate as tolerated  -C/w  therapeutic Lovenox  - C/w Dexamethasone 6mg IV daily for 10 days or until discharge (whichever is shorter)  - Duonebs Q6H LAWRENCE as well as Albuterol MDI Q2H PRN  -ESR, CRP, Procal, LDH elevated  - DDimer wnl  - Trend inflammatory markers q3 days  -Robitussin 100mg Syrup Q6H PRN  -Tylenol PRN for fever  - supportive care as necessary  - will hold Remdesivir for now, f/u covid antibodies Saturating well on RA now  - Monitor O2 and titrate as tolerated  -C/w  therapeutic Lovenox  - C/w Dexamethasone 6mg IV daily for 10 days or until discharge (whichever is shorter)  - Duonebs Q6H LAWRENCE as well as Albuterol MDI Q2H PRN  -ESR, CRP, Procal, LDH elevated  - DDimer wnl  - Trend inflammatory markers q3 days  -Robitussin 100mg Syrup Q6H PRN  -Tylenol PRN for fever  - supportive care as necessary  - will hold Remdesivir for now, f/u covid antibodies  - Iron supplemented

## 2021-01-10 LAB
ALBUMIN SERPL ELPH-MCNC: 3.1 G/DL — LOW (ref 3.5–5)
ALP SERPL-CCNC: 37 U/L — LOW (ref 40–120)
ALT FLD-CCNC: 65 U/L DA — HIGH (ref 10–60)
ANION GAP SERPL CALC-SCNC: 10 MMOL/L — SIGNIFICANT CHANGE UP (ref 5–17)
AST SERPL-CCNC: 43 U/L — HIGH (ref 10–40)
BASOPHILS # BLD AUTO: 0 K/UL — SIGNIFICANT CHANGE UP (ref 0–0.2)
BASOPHILS NFR BLD AUTO: 0 % — SIGNIFICANT CHANGE UP (ref 0–2)
BILIRUB SERPL-MCNC: 0.3 MG/DL — SIGNIFICANT CHANGE UP (ref 0.2–1.2)
BUN SERPL-MCNC: 30 MG/DL — HIGH (ref 7–18)
CALCIUM SERPL-MCNC: 9.2 MG/DL — SIGNIFICANT CHANGE UP (ref 8.4–10.5)
CHLORIDE SERPL-SCNC: 101 MMOL/L — SIGNIFICANT CHANGE UP (ref 96–108)
CO2 SERPL-SCNC: 30 MMOL/L — SIGNIFICANT CHANGE UP (ref 22–31)
CREAT SERPL-MCNC: 0.97 MG/DL — SIGNIFICANT CHANGE UP (ref 0.5–1.3)
EOSINOPHIL # BLD AUTO: 0 K/UL — SIGNIFICANT CHANGE UP (ref 0–0.5)
EOSINOPHIL NFR BLD AUTO: 0 % — SIGNIFICANT CHANGE UP (ref 0–6)
GLUCOSE SERPL-MCNC: 123 MG/DL — HIGH (ref 70–99)
HCT VFR BLD CALC: 41.1 % — SIGNIFICANT CHANGE UP (ref 39–50)
HGB BLD-MCNC: 13.5 G/DL — SIGNIFICANT CHANGE UP (ref 13–17)
IMM GRANULOCYTES NFR BLD AUTO: 0.3 % — SIGNIFICANT CHANGE UP (ref 0–1.5)
LYMPHOCYTES # BLD AUTO: 0.74 K/UL — LOW (ref 1–3.3)
LYMPHOCYTES # BLD AUTO: 10.1 % — LOW (ref 13–44)
MCHC RBC-ENTMCNC: 30.8 PG — SIGNIFICANT CHANGE UP (ref 27–34)
MCHC RBC-ENTMCNC: 32.8 GM/DL — SIGNIFICANT CHANGE UP (ref 32–36)
MCV RBC AUTO: 93.6 FL — SIGNIFICANT CHANGE UP (ref 80–100)
MONOCYTES # BLD AUTO: 0.66 K/UL — SIGNIFICANT CHANGE UP (ref 0–0.9)
MONOCYTES NFR BLD AUTO: 9 % — SIGNIFICANT CHANGE UP (ref 2–14)
NEUTROPHILS # BLD AUTO: 5.88 K/UL — SIGNIFICANT CHANGE UP (ref 1.8–7.4)
NEUTROPHILS NFR BLD AUTO: 80.6 % — HIGH (ref 43–77)
NRBC # BLD: 0 /100 WBCS — SIGNIFICANT CHANGE UP (ref 0–0)
PLATELET # BLD AUTO: 206 K/UL — SIGNIFICANT CHANGE UP (ref 150–400)
POTASSIUM SERPL-MCNC: 3.9 MMOL/L — SIGNIFICANT CHANGE UP (ref 3.5–5.3)
POTASSIUM SERPL-SCNC: 3.9 MMOL/L — SIGNIFICANT CHANGE UP (ref 3.5–5.3)
PROT SERPL-MCNC: 7.6 G/DL — SIGNIFICANT CHANGE UP (ref 6–8.3)
RBC # BLD: 4.39 M/UL — SIGNIFICANT CHANGE UP (ref 4.2–5.8)
RBC # FLD: 12.4 % — SIGNIFICANT CHANGE UP (ref 10.3–14.5)
SARS-COV-2 IGG SERPL QL IA: NEGATIVE — SIGNIFICANT CHANGE UP
SARS-COV-2 IGM SERPL IA-ACNC: <0.1 INDEX — SIGNIFICANT CHANGE UP
SODIUM SERPL-SCNC: 141 MMOL/L — SIGNIFICANT CHANGE UP (ref 135–145)
WBC # BLD: 7.3 K/UL — SIGNIFICANT CHANGE UP (ref 3.8–10.5)
WBC # FLD AUTO: 7.3 K/UL — SIGNIFICANT CHANGE UP (ref 3.8–10.5)

## 2021-01-10 RX ADMIN — Medication 81 MILLIGRAM(S): at 11:15

## 2021-01-10 RX ADMIN — Medication 6 MILLIGRAM(S): at 06:03

## 2021-01-10 RX ADMIN — PANTOPRAZOLE SODIUM 40 MILLIGRAM(S): 20 TABLET, DELAYED RELEASE ORAL at 06:04

## 2021-01-10 RX ADMIN — AMLODIPINE BESYLATE 5 MILLIGRAM(S): 2.5 TABLET ORAL at 06:04

## 2021-01-10 RX ADMIN — Medication 325 MILLIGRAM(S): at 11:15

## 2021-01-10 RX ADMIN — ENOXAPARIN SODIUM 40 MILLIGRAM(S): 100 INJECTION SUBCUTANEOUS at 11:15

## 2021-01-10 NOTE — PROGRESS NOTE ADULT - SUBJECTIVE AND OBJECTIVE BOX
Patient is a 65y old  Male who presents with a chief complaint of acute hypoxic respiratory failure (09 Jan 2021 13:23)    PATIENT IS SEEN AND EXAMINED IN MEDICAL FLOOR.    ALLERGIES:  No Known Drug Allergies  salicylates causes rash (Rash)      VITALS:    Vital Signs Last 24 Hrs  T(C): 36.8 (10 Todd 2021 13:49), Max: 36.8 (09 Jan 2021 14:40)  T(F): 98.2 (10 Todd 2021 13:49), Max: 98.2 (09 Jan 2021 14:40)  HR: 89 (10 Todd 2021 13:49) (83 - 92)  BP: 130/70 (10 Todd 2021 13:49) (125/59 - 147/85)  BP(mean): --  RR: 18 (10 Todd 2021 13:49) (17 - 18)  SpO2: 91% (10 Todd 2021 13:49) (86% - 98%)    LABS:    CBC Full  -  ( 10 Todd 2021 07:46 )  WBC Count : 7.30 K/uL  RBC Count : 4.39 M/uL  Hemoglobin : 13.5 g/dL  Hematocrit : 41.1 %  Platelet Count - Automated : 206 K/uL  Mean Cell Volume : 93.6 fl  Mean Cell Hemoglobin : 30.8 pg  Mean Cell Hemoglobin Concentration : 32.8 gm/dL  Auto Neutrophil # : 5.88 K/uL  Auto Lymphocyte # : 0.74 K/uL  Auto Monocyte # : 0.66 K/uL  Auto Eosinophil # : 0.00 K/uL  Auto Basophil # : 0.00 K/uL  Auto Neutrophil % : 80.6 %  Auto Lymphocyte % : 10.1 %  Auto Monocyte % : 9.0 %  Auto Eosinophil % : 0.0 %  Auto Basophil % : 0.0 %      01-10    141  |  101  |  30<H>  ----------------------------<  123<H>  3.9   |  30  |  0.97    Ca    9.2      10 Todd 2021 07:46  Phos  3.9     01-09  Mg     2.4     01-09    TPro  7.6  /  Alb  3.1<L>  /  TBili  0.3  /  DBili  x   /  AST  43<H>  /  ALT  65<H>  /  AlkPhos  37<L>  01-10    CAPILLARY BLOOD GLUCOSE        CARDIAC MARKERS ( 09 Jan 2021 08:22 )  <0.015 ng/mL / x     / x     / x     / x      CARDIAC MARKERS ( 08 Jan 2021 18:01 )  <0.015 ng/mL / x     / x     / x     / x          LIVER FUNCTIONS - ( 10 Todd 2021 07:46 )  Alb: 3.1 g/dL / Pro: 7.6 g/dL / ALK PHOS: 37 U/L / ALT: 65 U/L DA / AST: 43 U/L / GGT: x           Creatinine Trend: 0.97<--, 0.76<--, 0.97<--  I&O's Summary      MEDICATIONS:    MEDICATIONS  (STANDING):  amLODIPine   Tablet 5 milliGRAM(s) Oral daily  aspirin  chewable 81 milliGRAM(s) Oral daily  dexAMETHasone     Tablet 6 milliGRAM(s) Oral daily  enoxaparin Injectable 40 milliGRAM(s) SubCutaneous daily  ferrous    sulfate 325 milliGRAM(s) Oral daily  pantoprazole    Tablet 40 milliGRAM(s) Oral before breakfast  tiotropium 18 MICROgram(s) Capsule 1 Capsule(s) Inhalation daily      MEDICATIONS  (PRN):  acetaminophen   Tablet .. 650 milliGRAM(s) Oral every 4 hours PRN Temp greater or equal to 38.5C (101.3F)  ALBUTerol    90 MICROgram(s) HFA Inhaler 2 Puff(s) Inhalation every 4 hours PRN Shortness of Breath and/or Wheezing  benzonatate 100 milliGRAM(s) Oral three times a day PRN Cough  guaifenesin/dextromethorphan  Syrup 10 milliLiter(s) Oral every 4 hours PRN Cough      REVIEW OF SYSTEMS:                           ALL ROS DONE [ X   ]    CONSTITUTIONAL:  LETHARGIC [   ], FEVER [   ], UNRESPONSIVE [   ]  CVS:  CP  [   ], SOB, [   ], PALPITATIONS [   ], DIZZYNESS [   ]  RS: COUGH [   ], SPUTUM [   ]  GI: ABDOMINAL PAIN [   ], NAUSEA [   ], VOMITINGS [   ], DIARRHEA [   ], CONSTIPATION [   ]  :  DYSURIA [   ], NOCTURIA [   ], INCREASED FREQUENCY [   ], DRIBLING [   ],  SKELETAL: PAINFUL JOINTS [   ], SWOLLEN JOINTS [   ], NECK ACHE [   ], LOW BACK ACHE [   ],  SKIN : ULCERS [   ], RASH [   ], ITCHING [   ]  CNS: HEAD ACHE [   ], DOUBLE VISION [   ], BLURRED VISION [   ], AMS / CONFUSION [   ], SEIZURES [   ], WEAKNESS [   ],TINGLING / NUMBNESS [   ]    PHYSICAL EXAMINATION:  GENERAL APPEARANCE: NO DISTRESS  HEENT:  NO PALLOR, NO  JVD,  NO   NODES, NECK SUPPLE  CVS: S1 +, S2 +,   RS: AEEB,  OCCASIONAL  RALES +,   NO RONCHI  ABD: SOFT, NT, NO, BS +  EXT: NO PE  SKIN: WARM,   SKELETAL:  ROM ACCEPTABLE  CNS:  AAO X 3 ,   DEFICITS    RADIOLOGY :    EXAM:  XR CHEST PORTABLE IMMED 1V                            PROCEDURE DATE:  01/08/2021          INTERPRETATION:  Portable chest radiograph    CLINICAL INFORMATION: Dyspnea, shortness of breath.    TECHNIQUE:  Portable  AP view of the chest was obtained.    COMPARISON: No previous examinations are available for review.    FINDINGS:  The lungs show subtle/peripheral/RIGHT greater than LEFT ill-defined airspace disease. No pneumothorax.    The heart and mediastinum are within normal limits.    Visualized osseous structures are intact.        IMPRESSION: Subtle Ill-defined RIGHT greater than LEFT peripheral airspace disease..      ASSESSMENT :     Hypoxemia    COVID-19      PLAN:  HPI:  Patient is 65 year old male from home with no PMHx presenting to the ED with worsening shortness of breath for the last 4-5 days. He reports that today he went to an urgent care and was tested positive for COVID-19. Patient with worsening dyspnea on exertion and states that the shortness of breath has worsened NOW. pt on NR in ed and confortable. Pt denies and nausea, vomiting diarrhoea, chest pain, difficulty breathing. (08 Jan 2021 20:54)    # ACUTE HYPOXIC RESPIRATORY FAILURE S/T COVID19 VIRAL PNEUMONIA - PLACED ON SUPPLEMENTAL OXYGEN VIA NC. F/U COVID19 IGG, REVIEWED INFLAMMATORY MARKERS, PLACED ON DECADRON, DC THERAPEUTIC LOVENOX, TYLENOL, ROBITUSSIN, ALBUTEROL MDI, AIRBORNE/DROPLET PRECAUTIONS  - WILL SWITCH TO ASA 81   - NOTED HYPOXIA ON AMBULATION/ON ROOM AIR - WILL NEED HOME O2 - CM TEAM TO ARRANGE  - PULMONOLOGY CONSULT TO BE PLACED  # GI AND DVT PPX    RAMÍREZ CALRK MD COVERING LILY CLRAK MD.      Patient is a 65y old  Male who presents with a chief complaint of acute hypoxic respiratory failure (09 Jan 2021 13:23)    PATIENT IS SEEN AND EXAMINED IN MEDICAL FLOOR.    ALLERGIES:  No Known Drug Allergies  salicylates causes rash (Rash)      VITALS:    Vital Signs Last 24 Hrs  T(C): 36.8 (10 Todd 2021 13:49), Max: 36.8 (09 Jan 2021 14:40)  T(F): 98.2 (10 Todd 2021 13:49), Max: 98.2 (09 Jan 2021 14:40)  HR: 89 (10 Todd 2021 13:49) (83 - 92)  BP: 130/70 (10 Todd 2021 13:49) (125/59 - 147/85)  BP(mean): --  RR: 18 (10 Todd 2021 13:49) (17 - 18)  SpO2: 91% (10 Todd 2021 13:49) (86% - 98%)    LABS:    CBC Full  -  ( 10 Todd 2021 07:46 )  WBC Count : 7.30 K/uL  RBC Count : 4.39 M/uL  Hemoglobin : 13.5 g/dL  Hematocrit : 41.1 %  Platelet Count - Automated : 206 K/uL  Mean Cell Volume : 93.6 fl  Mean Cell Hemoglobin : 30.8 pg  Mean Cell Hemoglobin Concentration : 32.8 gm/dL  Auto Neutrophil # : 5.88 K/uL  Auto Lymphocyte # : 0.74 K/uL  Auto Monocyte # : 0.66 K/uL  Auto Eosinophil # : 0.00 K/uL  Auto Basophil # : 0.00 K/uL  Auto Neutrophil % : 80.6 %  Auto Lymphocyte % : 10.1 %  Auto Monocyte % : 9.0 %  Auto Eosinophil % : 0.0 %  Auto Basophil % : 0.0 %      01-10    141  |  101  |  30<H>  ----------------------------<  123<H>  3.9   |  30  |  0.97    Ca    9.2      10 Todd 2021 07:46  Phos  3.9     01-09  Mg     2.4     01-09    TPro  7.6  /  Alb  3.1<L>  /  TBili  0.3  /  DBili  x   /  AST  43<H>  /  ALT  65<H>  /  AlkPhos  37<L>  01-10    CAPILLARY BLOOD GLUCOSE        CARDIAC MARKERS ( 09 Jan 2021 08:22 )  <0.015 ng/mL / x     / x     / x     / x      CARDIAC MARKERS ( 08 Jan 2021 18:01 )  <0.015 ng/mL / x     / x     / x     / x          LIVER FUNCTIONS - ( 10 Todd 2021 07:46 )  Alb: 3.1 g/dL / Pro: 7.6 g/dL / ALK PHOS: 37 U/L / ALT: 65 U/L DA / AST: 43 U/L / GGT: x           Creatinine Trend: 0.97<--, 0.76<--, 0.97<--  I&O's Summary      MEDICATIONS:    MEDICATIONS  (STANDING):  amLODIPine   Tablet 5 milliGRAM(s) Oral daily  aspirin  chewable 81 milliGRAM(s) Oral daily  dexAMETHasone     Tablet 6 milliGRAM(s) Oral daily  enoxaparin Injectable 40 milliGRAM(s) SubCutaneous daily  ferrous    sulfate 325 milliGRAM(s) Oral daily  pantoprazole    Tablet 40 milliGRAM(s) Oral before breakfast  tiotropium 18 MICROgram(s) Capsule 1 Capsule(s) Inhalation daily      MEDICATIONS  (PRN):  acetaminophen   Tablet .. 650 milliGRAM(s) Oral every 4 hours PRN Temp greater or equal to 38.5C (101.3F)  ALBUTerol    90 MICROgram(s) HFA Inhaler 2 Puff(s) Inhalation every 4 hours PRN Shortness of Breath and/or Wheezing  benzonatate 100 milliGRAM(s) Oral three times a day PRN Cough  guaifenesin/dextromethorphan  Syrup 10 milliLiter(s) Oral every 4 hours PRN Cough      REVIEW OF SYSTEMS:                           ALL ROS DONE [ X   ]    CONSTITUTIONAL:  LETHARGIC [   ], FEVER [   ], UNRESPONSIVE [   ]  CVS:  CP  [   ], SOB, [   ], PALPITATIONS [   ], DIZZYNESS [   ]  RS: COUGH [   ], SPUTUM [   ]  GI: ABDOMINAL PAIN [   ], NAUSEA [   ], VOMITINGS [   ], DIARRHEA [   ], CONSTIPATION [   ]  :  DYSURIA [   ], NOCTURIA [   ], INCREASED FREQUENCY [   ], DRIBLING [   ],  SKELETAL: PAINFUL JOINTS [   ], SWOLLEN JOINTS [   ], NECK ACHE [   ], LOW BACK ACHE [   ],  SKIN : ULCERS [   ], RASH [   ], ITCHING [   ]  CNS: HEAD ACHE [   ], DOUBLE VISION [   ], BLURRED VISION [   ], AMS / CONFUSION [   ], SEIZURES [   ], WEAKNESS [   ],TINGLING / NUMBNESS [   ]    PHYSICAL EXAMINATION:  GENERAL APPEARANCE: NO DISTRESS  HEENT:  NO PALLOR, NO  JVD,  NO   NODES, NECK SUPPLE  CVS: S1 +, S2 +,   RS: AEEB,  OCCASIONAL  RALES +,   NO RONCHI  ABD: SOFT, NT, NO, BS +  EXT: NO PE  SKIN: WARM,   SKELETAL:  ROM ACCEPTABLE  CNS:  AAO X 3 ,   DEFICITS    RADIOLOGY :    EXAM:  XR CHEST PORTABLE IMMED 1V                            PROCEDURE DATE:  01/08/2021          INTERPRETATION:  Portable chest radiograph    CLINICAL INFORMATION: Dyspnea, shortness of breath.    TECHNIQUE:  Portable  AP view of the chest was obtained.    COMPARISON: No previous examinations are available for review.    FINDINGS:  The lungs show subtle/peripheral/RIGHT greater than LEFT ill-defined airspace disease. No pneumothorax.    The heart and mediastinum are within normal limits.    Visualized osseous structures are intact.        IMPRESSION: Subtle Ill-defined RIGHT greater than LEFT peripheral airspace disease..      ASSESSMENT :     Hypoxemia    COVID-19      PLAN:  HPI:  Patient is 65 year old male from home with no PMHx presenting to the ED with worsening shortness of breath for the last 4-5 days. He reports that today he went to an urgent care and was tested positive for COVID-19. Patient with worsening dyspnea on exertion and states that the shortness of breath has worsened NOW. pt on NR in ed and confortable. Pt denies and nausea, vomiting diarrhoea, chest pain, difficulty breathing. (08 Jan 2021 20:54)    # ACUTE HYPOXIC RESPIRATORY FAILURE S/T COVID19 VIRAL PNEUMONIA - PLACED ON SUPPLEMENTAL OXYGEN VIA NC. F/U COVID19 IGG, REVIEWED INFLAMMATORY MARKERS, PLACED ON DECADRON, PLACED ON REMDESEVIR, DC THERAPEUTIC LOVENOX, TYLENOL, ROBITUSSIN, ALBUTEROL MDI, AIRBORNE/DROPLET PRECAUTIONS  - WILL SWITCH TO ASA 81   - NOTED HYPOXIA ON AMBULATION/ON ROOM AIR - WILL NEED HOME O2 - CM TEAM TO ARRANGE  - PULMONOLOGY CONSULT TO BE PLACED  # GI AND DVT PPX    RAMÍREZ CLARK MD COVERING LILY CLARK MD.

## 2021-01-11 LAB
ALBUMIN SERPL ELPH-MCNC: 3 G/DL — LOW (ref 3.5–5)
ALP SERPL-CCNC: 39 U/L — LOW (ref 40–120)
ALT FLD-CCNC: 66 U/L DA — HIGH (ref 10–60)
ANION GAP SERPL CALC-SCNC: 9 MMOL/L — SIGNIFICANT CHANGE UP (ref 5–17)
AST SERPL-CCNC: 27 U/L — SIGNIFICANT CHANGE UP (ref 10–40)
BASOPHILS # BLD AUTO: SIGNIFICANT CHANGE UP K/UL (ref 0–0.2)
BASOPHILS NFR BLD AUTO: SIGNIFICANT CHANGE UP % (ref 0–2)
BILIRUB SERPL-MCNC: 0.4 MG/DL — SIGNIFICANT CHANGE UP (ref 0.2–1.2)
BUN SERPL-MCNC: 26 MG/DL — HIGH (ref 7–18)
CALCIUM SERPL-MCNC: 8.8 MG/DL — SIGNIFICANT CHANGE UP (ref 8.4–10.5)
CHLORIDE SERPL-SCNC: 105 MMOL/L — SIGNIFICANT CHANGE UP (ref 96–108)
CO2 SERPL-SCNC: 28 MMOL/L — SIGNIFICANT CHANGE UP (ref 22–31)
CREAT SERPL-MCNC: 0.84 MG/DL — SIGNIFICANT CHANGE UP (ref 0.5–1.3)
EOSINOPHIL # BLD AUTO: SIGNIFICANT CHANGE UP K/UL (ref 0–0.5)
EOSINOPHIL NFR BLD AUTO: SIGNIFICANT CHANGE UP % (ref 0–6)
GLUCOSE SERPL-MCNC: 107 MG/DL — HIGH (ref 70–99)
HCT VFR BLD CALC: 39.7 % — SIGNIFICANT CHANGE UP (ref 39–50)
HGB BLD-MCNC: 13.2 G/DL — SIGNIFICANT CHANGE UP (ref 13–17)
IMM GRANULOCYTES NFR BLD AUTO: SIGNIFICANT CHANGE UP % (ref 0–1.5)
LYMPHOCYTES # BLD AUTO: SIGNIFICANT CHANGE UP % (ref 13–44)
LYMPHOCYTES # BLD AUTO: SIGNIFICANT CHANGE UP K/UL (ref 1–3.3)
MCHC RBC-ENTMCNC: 30.7 PG — SIGNIFICANT CHANGE UP (ref 27–34)
MCHC RBC-ENTMCNC: 33.2 GM/DL — SIGNIFICANT CHANGE UP (ref 32–36)
MCV RBC AUTO: 92.3 FL — SIGNIFICANT CHANGE UP (ref 80–100)
MONOCYTES # BLD AUTO: SIGNIFICANT CHANGE UP K/UL (ref 0–0.9)
MONOCYTES NFR BLD AUTO: SIGNIFICANT CHANGE UP % (ref 2–14)
NEUTROPHILS # BLD AUTO: SIGNIFICANT CHANGE UP K/UL (ref 1.8–7.4)
NEUTROPHILS NFR BLD AUTO: SIGNIFICANT CHANGE UP % (ref 43–77)
NRBC # BLD: 0 /100 WBCS — SIGNIFICANT CHANGE UP (ref 0–0)
PLATELET # BLD AUTO: 234 K/UL — SIGNIFICANT CHANGE UP (ref 150–400)
POTASSIUM SERPL-MCNC: 3.9 MMOL/L — SIGNIFICANT CHANGE UP (ref 3.5–5.3)
POTASSIUM SERPL-SCNC: 3.9 MMOL/L — SIGNIFICANT CHANGE UP (ref 3.5–5.3)
PROT SERPL-MCNC: 7.5 G/DL — SIGNIFICANT CHANGE UP (ref 6–8.3)
RBC # BLD: 4.3 M/UL — SIGNIFICANT CHANGE UP (ref 4.2–5.8)
RBC # FLD: 12.5 % — SIGNIFICANT CHANGE UP (ref 10.3–14.5)
SODIUM SERPL-SCNC: 142 MMOL/L — SIGNIFICANT CHANGE UP (ref 135–145)
WBC # BLD: 5.68 K/UL — SIGNIFICANT CHANGE UP (ref 3.8–10.5)
WBC # FLD AUTO: 5.68 K/UL — SIGNIFICANT CHANGE UP (ref 3.8–10.5)

## 2021-01-11 RX ORDER — REMDESIVIR 5 MG/ML
INJECTION INTRAVENOUS
Refills: 0 | Status: DISCONTINUED | OUTPATIENT
Start: 2021-01-11 | End: 2021-01-12

## 2021-01-11 RX ORDER — SENNA PLUS 8.6 MG/1
2 TABLET ORAL AT BEDTIME
Refills: 0 | Status: DISCONTINUED | OUTPATIENT
Start: 2021-01-11 | End: 2021-01-12

## 2021-01-11 RX ORDER — REMDESIVIR 5 MG/ML
200 INJECTION INTRAVENOUS EVERY 24 HOURS
Refills: 0 | Status: COMPLETED | OUTPATIENT
Start: 2021-01-11 | End: 2021-01-11

## 2021-01-11 RX ORDER — REMDESIVIR 5 MG/ML
100 INJECTION INTRAVENOUS EVERY 24 HOURS
Refills: 0 | Status: DISCONTINUED | OUTPATIENT
Start: 2021-01-12 | End: 2021-01-12

## 2021-01-11 RX ORDER — POLYETHYLENE GLYCOL 3350 17 G/17G
17 POWDER, FOR SOLUTION ORAL ONCE
Refills: 0 | Status: COMPLETED | OUTPATIENT
Start: 2021-01-11 | End: 2021-01-11

## 2021-01-11 RX ORDER — LANOLIN ALCOHOL/MO/W.PET/CERES
5 CREAM (GRAM) TOPICAL AT BEDTIME
Refills: 0 | Status: DISCONTINUED | OUTPATIENT
Start: 2021-01-11 | End: 2021-01-12

## 2021-01-11 RX ORDER — REMDESIVIR 5 MG/ML
INJECTION INTRAVENOUS
Refills: 0 | Status: DISCONTINUED | OUTPATIENT
Start: 2021-01-11 | End: 2021-01-11

## 2021-01-11 RX ADMIN — POLYETHYLENE GLYCOL 3350 17 GRAM(S): 17 POWDER, FOR SOLUTION ORAL at 13:02

## 2021-01-11 RX ADMIN — Medication 325 MILLIGRAM(S): at 13:04

## 2021-01-11 RX ADMIN — PANTOPRAZOLE SODIUM 40 MILLIGRAM(S): 20 TABLET, DELAYED RELEASE ORAL at 06:10

## 2021-01-11 RX ADMIN — ENOXAPARIN SODIUM 40 MILLIGRAM(S): 100 INJECTION SUBCUTANEOUS at 13:03

## 2021-01-11 RX ADMIN — Medication 81 MILLIGRAM(S): at 13:03

## 2021-01-11 RX ADMIN — REMDESIVIR 500 MILLIGRAM(S): 5 INJECTION INTRAVENOUS at 13:02

## 2021-01-11 RX ADMIN — AMLODIPINE BESYLATE 5 MILLIGRAM(S): 2.5 TABLET ORAL at 06:10

## 2021-01-11 RX ADMIN — Medication 6 MILLIGRAM(S): at 06:10

## 2021-01-11 NOTE — PROGRESS NOTE ADULT - PROBLEM SELECTOR PLAN 1
Saturating well on RA now  - Monitor O2 and titrate as tolerated  -C/w  therapeutic Lovenox  - C/w Dexamethasone 6mg IV daily for 10 days or until discharge (whichever is shorter)  - Duonebs Q6H LAWRENCE as well as Albuterol MDI Q2H PRN  -ESR, CRP, Procal, LDH elevated  - DDimer wnl  - Trend inflammatory markers q3 days  -Robitussin 100mg Syrup Q6H PRN  -Tylenol PRN for fever  - supportive care as necessary  - will hold Remdesivir for now, f/u covid antibodies  - Iron supplemented Saturating 96% on 2 L NC    - Monitor O2 and titrate as tolerated  -C/w  therapeutic Lovenox  - C/w Dexamethasone 6mg IV daily (day 4 )  - Duonebs Q6H LAWRENCE as well as Albuterol MDI Q2H PRN  - Trend inflammatory markers q3 days  -Robitussin 100mg Syrup Q6H PRN  -Tylenol PRN for fever  - supportive care as necessary  - will start Remdesivir given  covid antibodies neg

## 2021-01-11 NOTE — PROGRESS NOTE ADULT - REASON FOR ADMISSION
acute hypoxic respiratory failure

## 2021-01-11 NOTE — PROGRESS NOTE ADULT - ASSESSMENT
Patient is 65 year old male from home with no PMHx presenting to the ED with worsening shortness of breath for the last 4-5 days. He reports that today he went to an urgent care and was tested positive for COVID-19 admitted for acute hypoxic respiratory failure
Patient is 65 year old male from home with no PMHx presenting to the ED with worsening shortness of breath for the last 4-5 days. He reports that today he went to an urgent care and was tested positive for COVID-19 admitted for acute hypoxic respiratory failure

## 2021-01-11 NOTE — PROGRESS NOTE ADULT - ATTENDING COMMENTS
PATIENT IS SEEN AND EXAMINED. CASE D/W FLOOR TEAM. ABOVE ROS/VS/PE REVIEWED AND VERIFIED.    HPI:  Patient is 65 year old male from home with no PMHx presenting to the ED with worsening shortness of breath for the last 4-5 days. He reports that today he went to an urgent care and was tested positive for COVID-19. Patient with worsening dyspnea on exertion and states that the shortness of breath has worsened NOW. pt on NR in ed and confortable. Pt denies and nausea, vomiting diarrhoea, chest pain, difficulty breathing. (08 Jan 2021 20:54)    # ACUTE HYPOXIC RESPIRATORY FAILURE S/T COVID19 VIRAL PNEUMONIA - PLACED ON SUPPLEMENTAL OXYGEN VIA NC. REVIEWED COVID19 IGG, REVIEWED INFLAMMATORY MARKERS, PLACED ON DECADRON, PLACED ON REMDESEVIR, TYLENOL, ROBITUSSIN, ALBUTEROL MDI, AIRBORNE/DROPLET PRECAUTIONS  - WILL SWITCH TO ASA 81   - NOTED HYPOXIA ON AMBULATION/ON ROOM AIR - WILL NEED HOME O2 - CM TEAM TO ARRANGE  - PULMONOLOGY CONSULT TO BE PLACED  # D/C IN A.M. IF MEDICALLY STABLE ON HOME O2   # GI AND DVT PPX    RAMÍREZ CLARK MD COVERING LILY CLARK MD.

## 2021-01-11 NOTE — PROGRESS NOTE ADULT - PROBLEM SELECTOR PLAN 3
RISK                                                          Points  [] Previous VTE                                           3  [] Thrombophilia                                        2  [] Lower limb paralysis                              2   [] Current Cancer                                       2   [x] Immobilization > 24 hrs                        1  [] ICU/CCU stay > 24 hours                       1  [x] Age > 60                                                   1  DVT ppx: Subq Lovenox  GI ppx: Protonix  Diet: Regular  Electrolytes replaced PRN  Dispo: Home  FULL CODE RISK                                                          Points  [] Previous VTE                                           3  [] Thrombophilia                                        2  [] Lower limb paralysis                              2   [] Current Cancer                                       2   [x] Immobilization > 24 hrs                        1  [] ICU/CCU stay > 24 hours                       1  [x] Age > 60                                                   1  DVT ppx: Subq Lovenox  GI ppx: Protonix  Dispo: Home  FULL CODE

## 2021-01-11 NOTE — PROGRESS NOTE ADULT - SUBJECTIVE AND OBJECTIVE BOX
PGY-1 Progress Note discussed with attending    PAGER #: [[phone]] TILL 5:00 PM  PLEASE CONTACT ON CALL TEAM:  - On Call Team (Please refer to Jacoby) FROM 5:00 PM - 8:30PM  - Nightfloat Team FROM 8:30 -7:30 AM    CHIEF COMPLAINT & BRIEF HOSPITAL COURSE:    INTERVAL HPI/OVERNIGHT EVENTS:       REVIEW OF SYSTEMS:  CONSTITUTIONAL: No fever, weight loss, or fatigue  RESPIRATORY: No cough, wheezing, chills or hemoptysis; No shortness of breath  CARDIOVASCULAR: No chest pain, palpitations, dizziness, or leg swelling  GASTROINTESTINAL: No abdominal pain. No nausea, vomiting, or hematemesis; No diarrhea or constipation. No melena or hematochezia.  GENITOURINARY: No dysuria or hematuria, urinary frequency  NEUROLOGICAL: No headaches, memory loss, loss of strength, numbness, or tremors  SKIN: No itching, burning, rashes, or lesions     MEDICATIONS  (STANDING):  amLODIPine   Tablet 5 milliGRAM(s) Oral daily  aspirin  chewable 81 milliGRAM(s) Oral daily  dexAMETHasone     Tablet 6 milliGRAM(s) Oral daily  enoxaparin Injectable 40 milliGRAM(s) SubCutaneous daily  ferrous    sulfate 325 milliGRAM(s) Oral daily  pantoprazole    Tablet 40 milliGRAM(s) Oral before breakfast  remdesivir  IVPB   IV Intermittent   tiotropium 18 MICROgram(s) Capsule 1 Capsule(s) Inhalation daily    MEDICATIONS  (PRN):  acetaminophen   Tablet .. 650 milliGRAM(s) Oral every 4 hours PRN Temp greater or equal to 38.5C (101.3F)  ALBUTerol    90 MICROgram(s) HFA Inhaler 2 Puff(s) Inhalation every 4 hours PRN Shortness of Breath and/or Wheezing  benzonatate 100 milliGRAM(s) Oral three times a day PRN Cough  guaifenesin/dextromethorphan  Syrup 10 milliLiter(s) Oral every 4 hours PRN Cough      Vital Signs Last 24 Hrs  T(C): 37.2 (11 Jan 2021 05:30), Max: 37.2 (11 Jan 2021 05:30)  T(F): 98.9 (11 Jan 2021 05:30), Max: 98.9 (11 Jan 2021 05:30)  HR: 81 (10 Todd 2021 22:02) (81 - 89)  BP: 130/79 (11 Jan 2021 05:30) (130/70 - 134/85)  BP(mean): --  RR: 18 (11 Jan 2021 05:30) (18 - 18)  SpO2: 96% (11 Jan 2021 05:30) (89% - 96%)    PHYSICAL EXAMINATION:  GENERAL: NAD, well built  HEAD:  Atraumatic, Normocephalic  EYES:  conjunctiva and sclera clear  NECK: Supple, No JVD, Normal thyroid  CHEST/LUNG: Clear to auscultation. Clear to percussion bilaterally; No rales, rhonchi, wheezing, or rubs  HEART: Regular rate and rhythm; No murmurs, rubs, or gallops  ABDOMEN: Soft, Nontender, Nondistended; Bowel sounds present  NERVOUS SYSTEM:  Alert & Oriented X3,    EXTREMITIES:  2+ Peripheral Pulses, No clubbing, cyanosis, or edema  SKIN: warm dry                          13.2   x     )-----------( 234      ( 11 Jan 2021 06:26 )             39.7     01-11    142  |  105  |  26<H>  ----------------------------<  107<H>  3.9   |  28  |  0.84    Ca    8.8      11 Jan 2021 06:26    TPro  7.5  /  Alb  3.0<L>  /  TBili  0.4  /  DBili  x   /  AST  27  /  ALT  66<H>  /  AlkPhos  39<L>  01-11    LIVER FUNCTIONS - ( 11 Jan 2021 06:26 )  Alb: 3.0 g/dL / Pro: 7.5 g/dL / ALK PHOS: 39 U/L / ALT: 66 U/L DA / AST: 27 U/L / GGT: x                       CAPILLARY BLOOD GLUCOSE  CAPILLARY BLOOD GLUCOSE        CAPILLARY BLOOD GLUCOSE          RADIOLOGY & ADDITIONAL TESTS:                   PGY-1 Progress Note discussed with attending    PAGER #: [[phone]] TILL 5:00 PM  PLEASE CONTACT ON CALL TEAM:  - On Call Team (Please refer to Jacoby) FROM 5:00 PM - 8:30PM  - Nightfloat Team FROM 8:30 -7:30 AM        INTERVAL HPI/OVERNIGHT EVENTS:   patient examined bedside, he is comfortable in bed , saturating 96 % on 2 L NC , c/o constipation , will start on remdisivir today given covid Ab neg.     REVIEW OF SYSTEMS:  CONSTITUTIONAL: No fever, weight loss, or fatigue  RESPIRATORY: SOB  CARDIOVASCULAR: No chest pain, palpitations, dizziness, or leg swelling  GASTROINTESTINAL: c/o constipation.   GENITOURINARY: No dysuria or hematuria, urinary frequency  NEUROLOGICAL: No headaches, memory loss, loss of strength, numbness, or tremors  SKIN: No itching, burning, rashes, or lesions     MEDICATIONS  (STANDING):  amLODIPine   Tablet 5 milliGRAM(s) Oral daily  aspirin  chewable 81 milliGRAM(s) Oral daily  dexAMETHasone     Tablet 6 milliGRAM(s) Oral daily  enoxaparin Injectable 40 milliGRAM(s) SubCutaneous daily  ferrous    sulfate 325 milliGRAM(s) Oral daily  pantoprazole    Tablet 40 milliGRAM(s) Oral before breakfast  remdesivir  IVPB   IV Intermittent   tiotropium 18 MICROgram(s) Capsule 1 Capsule(s) Inhalation daily    MEDICATIONS  (PRN):  acetaminophen   Tablet .. 650 milliGRAM(s) Oral every 4 hours PRN Temp greater or equal to 38.5C (101.3F)  ALBUTerol    90 MICROgram(s) HFA Inhaler 2 Puff(s) Inhalation every 4 hours PRN Shortness of Breath and/or Wheezing  benzonatate 100 milliGRAM(s) Oral three times a day PRN Cough  guaifenesin/dextromethorphan  Syrup 10 milliLiter(s) Oral every 4 hours PRN Cough      Vital Signs Last 24 Hrs  T(C): 37.2 (11 Jan 2021 05:30), Max: 37.2 (11 Jan 2021 05:30)  T(F): 98.9 (11 Jan 2021 05:30), Max: 98.9 (11 Jan 2021 05:30)  HR: 81 (10 Todd 2021 22:02) (81 - 89)  BP: 130/79 (11 Jan 2021 05:30) (130/70 - 134/85)  BP(mean): --  RR: 18 (11 Jan 2021 05:30) (18 - 18)  SpO2: 96% (11 Jan 2021 05:30) (89% - 96%)    PHYSICAL EXAMINATION:  GENERAL: NAD,on NC   HEAD:  Atraumatic, Normocephalic  EYES:  conjunctiva and sclera clear  NECK: Supple, No JVD, Normal thyroid  CHEST/LUNG: Clear to auscultation. Clear to percussion bilaterally; No rales, rhonchi, wheezing, or rubs  HEART: Regular rate and rhythm; No murmurs, rubs, or gallops  ABDOMEN: Soft, Nontender, ; Bowel sounds present, + abdomen distended.   NERVOUS SYSTEM:  Alert & Oriented X3,    EXTREMITIES:  2+ Peripheral Pulses, No clubbing, cyanosis, or edema  SKIN: warm dry                          13.2   x     )-----------( 234      ( 11 Jan 2021 06:26 )             39.7     01-11    142  |  105  |  26<H>  ----------------------------<  107<H>  3.9   |  28  |  0.84    Ca    8.8      11 Jan 2021 06:26    TPro  7.5  /  Alb  3.0<L>  /  TBili  0.4  /  DBili  x   /  AST  27  /  ALT  66<H>  /  AlkPhos  39<L>  01-11    LIVER FUNCTIONS - ( 11 Jan 2021 06:26 )  Alb: 3.0 g/dL / Pro: 7.5 g/dL / ALK PHOS: 39 U/L / ALT: 66 U/L DA / AST: 27 U/L / GGT: x                       CAPILLARY BLOOD GLUCOSE  CAPILLARY BLOOD GLUCOSE        CAPILLARY BLOOD GLUCOSE          RADIOLOGY & ADDITIONAL TESTS:                   PGY-1 Progress Note discussed with attending    PAGER #: [[phone]] TILL 5:00 PM  PLEASE CONTACT ON CALL TEAM:  - On Call Team (Please refer to Jacoby) FROM 5:00 PM - 8:30PM  - Nightfloat Team FROM 8:30 -7:30 AM        INTERVAL HPI/OVERNIGHT EVENTS:   patient examined bedside, he is comfortable in bed , saturating 96 % on 2 L NC , c/o constipation , will start on remdisivir today given covid Ab neg. patient is desaturating 88 on RA , will be dced on home oxygen.     REVIEW OF SYSTEMS:  CONSTITUTIONAL: No fever, weight loss, or fatigue  RESPIRATORY: SOB  CARDIOVASCULAR: No chest pain, palpitations, dizziness, or leg swelling  GASTROINTESTINAL: c/o constipation.   GENITOURINARY: No dysuria or hematuria, urinary frequency  NEUROLOGICAL: No headaches, memory loss, loss of strength, numbness, or tremors  SKIN: No itching, burning, rashes, or lesions     MEDICATIONS  (STANDING):  amLODIPine   Tablet 5 milliGRAM(s) Oral daily  aspirin  chewable 81 milliGRAM(s) Oral daily  dexAMETHasone     Tablet 6 milliGRAM(s) Oral daily  enoxaparin Injectable 40 milliGRAM(s) SubCutaneous daily  ferrous    sulfate 325 milliGRAM(s) Oral daily  pantoprazole    Tablet 40 milliGRAM(s) Oral before breakfast  remdesivir  IVPB   IV Intermittent   tiotropium 18 MICROgram(s) Capsule 1 Capsule(s) Inhalation daily    MEDICATIONS  (PRN):  acetaminophen   Tablet .. 650 milliGRAM(s) Oral every 4 hours PRN Temp greater or equal to 38.5C (101.3F)  ALBUTerol    90 MICROgram(s) HFA Inhaler 2 Puff(s) Inhalation every 4 hours PRN Shortness of Breath and/or Wheezing  benzonatate 100 milliGRAM(s) Oral three times a day PRN Cough  guaifenesin/dextromethorphan  Syrup 10 milliLiter(s) Oral every 4 hours PRN Cough      Vital Signs Last 24 Hrs  T(C): 37.2 (11 Jan 2021 05:30), Max: 37.2 (11 Jan 2021 05:30)  T(F): 98.9 (11 Jan 2021 05:30), Max: 98.9 (11 Jan 2021 05:30)  HR: 81 (10 Todd 2021 22:02) (81 - 89)  BP: 130/79 (11 Jan 2021 05:30) (130/70 - 134/85)  BP(mean): --  RR: 18 (11 Jan 2021 05:30) (18 - 18)  SpO2: 96% (11 Jan 2021 05:30) (89% - 96%)    PHYSICAL EXAMINATION:  GENERAL: NAD,on NC   HEAD:  Atraumatic, Normocephalic  EYES:  conjunctiva and sclera clear  NECK: Supple, No JVD, Normal thyroid  CHEST/LUNG: Clear to auscultation. Clear to percussion bilaterally; No rales, rhonchi, wheezing, or rubs  HEART: Regular rate and rhythm; No murmurs, rubs, or gallops  ABDOMEN: Soft, Nontender, ; Bowel sounds present, + abdomen distended.   NERVOUS SYSTEM:  Alert & Oriented X3,    EXTREMITIES:  2+ Peripheral Pulses, No clubbing, cyanosis, or edema  SKIN: warm dry                          13.2   x     )-----------( 234      ( 11 Jan 2021 06:26 )             39.7     01-11    142  |  105  |  26<H>  ----------------------------<  107<H>  3.9   |  28  |  0.84    Ca    8.8      11 Jan 2021 06:26    TPro  7.5  /  Alb  3.0<L>  /  TBili  0.4  /  DBili  x   /  AST  27  /  ALT  66<H>  /  AlkPhos  39<L>  01-11    LIVER FUNCTIONS - ( 11 Jan 2021 06:26 )  Alb: 3.0 g/dL / Pro: 7.5 g/dL / ALK PHOS: 39 U/L / ALT: 66 U/L DA / AST: 27 U/L / GGT: x                       CAPILLARY BLOOD GLUCOSE  CAPILLARY BLOOD GLUCOSE        CAPILLARY BLOOD GLUCOSE          RADIOLOGY & ADDITIONAL TESTS:

## 2021-01-11 NOTE — PROGRESS NOTE ADULT - PROBLEM SELECTOR PLAN 2
Started on amlodipine for elevated BPs  Monitor BP titrate meds prn on amlodipine for elevated BPs  Monitor BP titrate meds prn

## 2021-01-12 VITALS
SYSTOLIC BLOOD PRESSURE: 149 MMHG | TEMPERATURE: 98 F | RESPIRATION RATE: 18 BRPM | HEART RATE: 99 BPM | DIASTOLIC BLOOD PRESSURE: 81 MMHG | OXYGEN SATURATION: 94 %

## 2021-01-12 LAB
ALBUMIN SERPL ELPH-MCNC: 3.1 G/DL — LOW (ref 3.5–5)
ALP SERPL-CCNC: 44 U/L — SIGNIFICANT CHANGE UP (ref 40–120)
ALT FLD-CCNC: 78 U/L DA — HIGH (ref 10–60)
ANION GAP SERPL CALC-SCNC: 7 MMOL/L — SIGNIFICANT CHANGE UP (ref 5–17)
AST SERPL-CCNC: 29 U/L — SIGNIFICANT CHANGE UP (ref 10–40)
BILIRUB SERPL-MCNC: 0.4 MG/DL — SIGNIFICANT CHANGE UP (ref 0.2–1.2)
BUN SERPL-MCNC: 22 MG/DL — HIGH (ref 7–18)
CALCIUM SERPL-MCNC: 8.5 MG/DL — SIGNIFICANT CHANGE UP (ref 8.4–10.5)
CHLORIDE SERPL-SCNC: 104 MMOL/L — SIGNIFICANT CHANGE UP (ref 96–108)
CO2 SERPL-SCNC: 27 MMOL/L — SIGNIFICANT CHANGE UP (ref 22–31)
CREAT SERPL-MCNC: 0.81 MG/DL — SIGNIFICANT CHANGE UP (ref 0.5–1.3)
GLUCOSE SERPL-MCNC: 90 MG/DL — SIGNIFICANT CHANGE UP (ref 70–99)
HCT VFR BLD CALC: 40.4 % — SIGNIFICANT CHANGE UP (ref 39–50)
HGB BLD-MCNC: 13.5 G/DL — SIGNIFICANT CHANGE UP (ref 13–17)
MAGNESIUM SERPL-MCNC: 2.3 MG/DL — SIGNIFICANT CHANGE UP (ref 1.6–2.6)
MCHC RBC-ENTMCNC: 30.9 PG — SIGNIFICANT CHANGE UP (ref 27–34)
MCHC RBC-ENTMCNC: 33.4 GM/DL — SIGNIFICANT CHANGE UP (ref 32–36)
MCV RBC AUTO: 92.4 FL — SIGNIFICANT CHANGE UP (ref 80–100)
NRBC # BLD: 0 /100 WBCS — SIGNIFICANT CHANGE UP (ref 0–0)
PHOSPHATE SERPL-MCNC: 3 MG/DL — SIGNIFICANT CHANGE UP (ref 2.5–4.5)
PLATELET # BLD AUTO: 265 K/UL — SIGNIFICANT CHANGE UP (ref 150–400)
POTASSIUM SERPL-MCNC: 4 MMOL/L — SIGNIFICANT CHANGE UP (ref 3.5–5.3)
POTASSIUM SERPL-SCNC: 4 MMOL/L — SIGNIFICANT CHANGE UP (ref 3.5–5.3)
PROT SERPL-MCNC: 7.7 G/DL — SIGNIFICANT CHANGE UP (ref 6–8.3)
RBC # BLD: 4.37 M/UL — SIGNIFICANT CHANGE UP (ref 4.2–5.8)
RBC # FLD: 12.2 % — SIGNIFICANT CHANGE UP (ref 10.3–14.5)
SODIUM SERPL-SCNC: 138 MMOL/L — SIGNIFICANT CHANGE UP (ref 135–145)
WBC # BLD: 6.41 K/UL — SIGNIFICANT CHANGE UP (ref 3.8–10.5)
WBC # FLD AUTO: 6.41 K/UL — SIGNIFICANT CHANGE UP (ref 3.8–10.5)

## 2021-01-12 RX ORDER — AMLODIPINE BESYLATE 2.5 MG/1
1 TABLET ORAL
Qty: 30 | Refills: 0
Start: 2021-01-12 | End: 2021-02-10

## 2021-01-12 RX ORDER — ALBUTEROL 90 UG/1
2 AEROSOL, METERED ORAL
Qty: 0 | Refills: 0 | DISCHARGE
Start: 2021-01-12

## 2021-01-12 RX ORDER — AMLODIPINE BESYLATE 2.5 MG/1
1 TABLET ORAL
Qty: 0 | Refills: 0 | DISCHARGE
Start: 2021-01-12

## 2021-01-12 RX ORDER — ASPIRIN/CALCIUM CARB/MAGNESIUM 324 MG
1 TABLET ORAL
Qty: 30 | Refills: 0
Start: 2021-01-12 | End: 2021-02-10

## 2021-01-12 RX ORDER — ALBUTEROL 90 UG/1
2 AEROSOL, METERED ORAL
Qty: 360 | Refills: 0
Start: 2021-01-12 | End: 2021-02-10

## 2021-01-12 RX ORDER — DEXAMETHASONE 0.5 MG/5ML
1 ELIXIR ORAL
Qty: 5 | Refills: 0
Start: 2021-01-12 | End: 2021-01-16

## 2021-01-12 RX ADMIN — Medication 325 MILLIGRAM(S): at 11:20

## 2021-01-12 RX ADMIN — Medication 81 MILLIGRAM(S): at 11:20

## 2021-01-12 RX ADMIN — REMDESIVIR 500 MILLIGRAM(S): 5 INJECTION INTRAVENOUS at 12:52

## 2021-01-12 RX ADMIN — AMLODIPINE BESYLATE 5 MILLIGRAM(S): 2.5 TABLET ORAL at 06:49

## 2021-01-12 RX ADMIN — Medication 6 MILLIGRAM(S): at 06:49

## 2021-01-12 RX ADMIN — ENOXAPARIN SODIUM 40 MILLIGRAM(S): 100 INJECTION SUBCUTANEOUS at 11:20

## 2021-01-12 RX ADMIN — PANTOPRAZOLE SODIUM 40 MILLIGRAM(S): 20 TABLET, DELAYED RELEASE ORAL at 06:49

## 2021-01-12 RX ADMIN — Medication 5 MILLIGRAM(S): at 00:27

## 2021-01-12 NOTE — DISCHARGE NOTE NURSING/CASE MANAGEMENT/SOCIAL WORK - PATIENT PORTAL LINK FT
You can access the FollowMyHealth Patient Portal offered by [facility] by registering at the following website: [email or web address]. By joining [facility]’s FollowMyHealth portal, you will also be able to view your health information using other applications (apps) compatible with our system.

## 2021-01-12 NOTE — DISCHARGE NOTE PROVIDER - HOSPITAL COURSE
Patient is 65 year old male from home with no PMHx presenting to the ED with worsening shortness of breath . He reports that  he went to an urgent care and was tested positive for COVID-19. Patient with worsening dyspnea on exertion and states that the shortness of breath has worsened at time of admission.  Pt denies and nausea, vomiting diarrhoea, chest pain, difficulty breathing.Patient was found to be COVID positive in the ED      Patient is 65 year old male from home with no PMHx presenting to the ED with worsening shortness of breath . He reports that  he went to an urgent care and was tested positive for COVID-19. Patient with worsening dyspnea on exertion and states that the shortness of breath has worsened at time of admission.  Pt denies and nausea, vomiting diarrhoea, chest pain, difficulty breathing.Patient was found to be COVID positive in the ED .patient was started on supportive measures , decadron and remdsivir and patient condition improved. Patient is desaturating to 88 % on room air , patient ios to be discharged on home oxygen. Patient is 65 year old male from home with no PMHx presenting to the ED with worsening shortness of breath . He reports that  he went to an urgent care and was tested positive for COVID-19. Patient with worsening dyspnea on exertion and states that the shortness of breath has worsened at time of admission.  Pt denies and nausea, vomiting diarrhoea, chest pain, difficulty breathing.Patient was found to be COVID positive in the ED .patient was started on supportive measures , decadron and remdsivir and patient condition improved. Patient is desaturating to 88 % on room air , patient is to be discharged on home oxygen.    Patient is 65 year old male from home with no PMHx presenting to the ED with worsening shortness of breath . He reports that  he went to an urgent care and was tested positive for COVID-19. Patient with worsening dyspnea on exertion and states that the shortness of breath has worsened at time of admission.  Pt denies and nausea, vomiting diarrhoea, chest pain, difficulty breathing.Patient was found to be COVID positive in the ED .patient was started on supportive measures , decadron and remdsivir and patient condition improved. Patient is desaturating to 88 % on room air , patient is to be discharged on home oxygen.   The patient has requested to leave the ED against medical advice. Reason(s) for leaving include the following: I believe this patient is of sound mind and competent to refuse medical care. The patient is answering and asking questions appropriately. Patient is not intoxicated and do not appear to be under the influence of any illicit drugs at this time. Patient is oriented to person, place and time. Patient is not psychotic, delusional, suicidal, homicidal or hallucinating. Patient demonstrates a normal mental capacity to make decisions regarding their healthcare. The patient has been advised of the risks of leaving AMA, which include but are not limited to death, coma, permanent disability, loss of current lifestyle, delay in diagnosis  The patient has been advised that should change his mind; they are totally welcome to return, here, at any time. The patient understands that in no way does an AMA discharge mean that I do not want them to have the best medical care available. To this end, I have provided appropriate prescriptions, referrals, and discharge instructions. and notified the attending    This patient has signed the AMA form

## 2021-01-12 NOTE — DISCHARGE NOTE PROVIDER - CARE PROVIDER_API CALL
Julieta Gamino)  Internal Medicine  [address] [address] [place] [address]  Phone: [phone]  Fax: [phone]  Follow Up Time:

## 2021-01-12 NOTE — DISCHARGE NOTE PROVIDER - NSDCCPCAREPLAN_GEN_ALL_CORE_FT
PRINCIPAL DISCHARGE DIAGNOSIS  Diagnosis: COVID-19  Assessment and Plan of Treatment: You presented with short of breath .You were found to be COVID positive in the ED .You were  started on supportive measures , decadron and remdsivir and Your  condition improved.You were desaturating to 88 % on room air , You are recommended to be discharged on home oxygen.   CORONAVIRUS INSTRUCTIONS:   Based on your current clinical status and stability, it has been determined that you no longer need hospitalization and can recover while remaining in self-quarantine at home. You should follow the prevention steps below until a healthcare provider or local or state health department says you can return to your normal activities.   1. You should restrict activities outside your home, except for getting medical care.   2. Do not go to work, school, or public areas.   3. Avoid using public transportation, ride-sharing, or taxis.   4. Separate yourself from other people and animals in your home as much as possible.  When you are around other people (e.g., sharing a room or vehicle) you should wear a facemask.  5. Wash your hands often with soap and water for at least 20 seconds, especially after blowing your nose, coughing, or sneezing; going to the bathroom; and before eating or preparing food.  6. Cover your mouth and nose with a tissue when you cough or sneeze. Throw used tissues in a lined trash can. Immediately wash your hands with soap and water for at least 20 seconds  7. High touch surfaces include counters, tabletops, doorknobs, bathroom fixtures, toilets, phones, keyboards, tablets, and bedside tables.  8. Avoid sharing dishes, drinking glasses, cups, eating utensils, towels, or bedding with other people or pets in your home. After using these items, they should be washed thoroughly with soap and water.  You are strongly advised to seek prompt medical attention if your illness worsens or you develop new symptoms like fever or difficulty breathing.        SECONDARY DISCHARGE DIAGNOSES  Diagnosis: HTN (hypertension)  Assessment and Plan of Treatment: Blood Pressure Control , Please continue current medication regimen, and follow up with your PCP  - You were founde to have high blood pressure.   -You were started on amlodipine.    - You should continue on the current antihypertensive regimen regularly.  - You blood pressure should be within 140-120/80-90.  - You should follow-up with your PCP within 1 week of your discharge for routine blood pressure monitoring at your next visit.  - Notify your doctor if you have any of the following symptoms:   (Dizziness, Lightheadedness, Blurry vision, Headache, Chest pain, Shortness of breath.)  - You should maintain healthy lifestyle by eating healthy low salt diet, avoid fatty food, weight loss, exercise regularly as tolerated 30 mins X 3 time per week.       PRINCIPAL DISCHARGE DIAGNOSIS  Diagnosis: COVID-19  Assessment and Plan of Treatment: You presented with short of breath .You were found to be COVID positive in the ED .You were  started on supportive measures , decadron and remdsivir and Your  condition improved.You were desaturating to 88 % on room air , You are recommended to be discharged on home oxygen.   1. You should restrict activities outside your home, except for getting medical care.   2. Do not go to work, school, or public areas.   3. Avoid using public transportation, ride-sharing, or taxis.   4. Separate yourself from other people and animals in your home as much as possible.  When you are around other people (e.g., sharing a room or vehicle) you should wear a facemask.  5. Wash your hands often with soap and water for at least 20 seconds, especially after blowing your nose, coughing, or sneezing; going to the bathroom; and before eating or preparing food.  6. Cover your mouth and nose with a tissue when you cough or sneeze. Throw used tissues in a lined trash can. Immediately wash your hands with soap and water for at least 20 seconds  7. High touch surfaces include counters, tabletops, doorknobs, bathroom fixtures, toilets, phones, keyboards, tablets, and bedside tables.  8. Avoid sharing dishes, drinking glasses, cups, eating utensils, towels, or bedding with other people or pets in your home. After using these items, they should be washed thoroughly with soap and water.  You are strongly advised to seek prompt medical attention if your illness worsens or you develop new symptoms like fever or difficulty breathing.        SECONDARY DISCHARGE DIAGNOSES  Diagnosis: HTN (hypertension)  Assessment and Plan of Treatment: Blood Pressure Control , Please continue current medication regimen, and follow up with your PCP  - You were founde to have high blood pressure.   -You were started on amlodipine.    - You should continue on the current antihypertensive regimen regularly.  - You blood pressure should be within 140-120/80-90.  - You should follow-up with your PCP within 1 week of your discharge for routine blood pressure monitoring at your next visit.  - Notify your doctor if you have any of the following symptoms:   (Dizziness, Lightheadedness, Blurry vision, Headache, Chest pain, Shortness of breath.)  - You should maintain healthy lifestyle by eating healthy low salt diet, avoid fatty food, weight loss, exercise regularly as tolerated 30 mins X 3 time per week.       PRINCIPAL DISCHARGE DIAGNOSIS  Diagnosis: COVID-19  Assessment and Plan of Treatment: You presented with short of breath .You were found to be COVID positive in the ED .You were  started on supportive measures , decadron and remdsivir and Your  condition improved.You were desaturating to 88 % on room air , You are recommended to be discharged on home oxygen. You insisted to leave AMA and didnot wait for home oxygen to be delivered.   1. You should restrict activities outside your home, except for getting medical care.   2. Do not go to work, school, or public areas.   3. Avoid using public transportation, ride-sharing, or taxis.   4. Separate yourself from other people and animals in your home as much as possible.  When you are around other people (e.g., sharing a room or vehicle) you should wear a facemask.  5. Wash your hands often with soap and water for at least 20 seconds, especially after blowing your nose, coughing, or sneezing; going to the bathroom; and before eating or preparing food.  6. Cover your mouth and nose with a tissue when you cough or sneeze. Throw used tissues in a lined trash can. Immediately wash your hands with soap and water for at least 20 seconds  7. High touch surfaces include counters, tabletops, doorknobs, bathroom fixtures, toilets, phones, keyboards, tablets, and bedside tables.  8. Avoid sharing dishes, drinking glasses, cups, eating utensils, towels, or bedding with other people or pets in your home. After using these items, they should be washed thoroughly with soap and water.  You are strongly advised to seek prompt medical attention if your illness worsens or you develop new symptoms like fever or difficulty breathing.        SECONDARY DISCHARGE DIAGNOSES  Diagnosis: HTN (hypertension)  Assessment and Plan of Treatment: Blood Pressure Control , Please continue current medication regimen, and follow up with your PCP  - You were founde to have high blood pressure.   -You were started on amlodipine.    - You should continue on the current antihypertensive regimen regularly.  - You blood pressure should be within 140-120/80-90.  - You should follow-up with your PCP within 1 week of your discharge for routine blood pressure monitoring at your next visit.  - Notify your doctor if you have any of the following symptoms:   (Dizziness, Lightheadedness, Blurry vision, Headache, Chest pain, Shortness of breath.)  - You should maintain healthy lifestyle by eating healthy low salt diet, avoid fatty food, weight loss, exercise regularly as tolerated 30 mins X 3 time per week.

## 2021-01-12 NOTE — DISCHARGE NOTE PROVIDER - NSDCMRMEDTOKEN_GEN_ALL_CORE_FT
albuterol 90 mcg/inh inhalation aerosol: 2 puff(s) inhaled every 4 hours, As needed, Shortness of Breath and/or Wheezing  amLODIPine 5 mg oral tablet: 1 tab(s) orally once a day   albuterol 90 mcg/inh inhalation aerosol: 2 puff(s) inhaled every 4 hours, As needed, Shortness of Breath and/or Wheezing  amLODIPine 5 mg oral tablet: 1 tab(s) orally once a day  aspirin 81 mg oral tablet, chewable: 1 tab(s) orally once a day  dexamethasone 6 mg oral tablet: 1 tab(s) orally once a day

## 2021-01-21 PROCEDURE — 84550 ASSAY OF BLOOD/URIC ACID: CPT

## 2021-01-21 PROCEDURE — 86769 SARS-COV-2 COVID-19 ANTIBODY: CPT

## 2021-01-21 PROCEDURE — 82728 ASSAY OF FERRITIN: CPT

## 2021-01-21 PROCEDURE — 82746 ASSAY OF FOLIC ACID SERUM: CPT

## 2021-01-21 PROCEDURE — 80307 DRUG TEST PRSMV CHEM ANLYZR: CPT

## 2021-01-21 PROCEDURE — 84436 ASSAY OF TOTAL THYROXINE: CPT

## 2021-01-21 PROCEDURE — 83690 ASSAY OF LIPASE: CPT

## 2021-01-21 PROCEDURE — 84100 ASSAY OF PHOSPHORUS: CPT

## 2021-01-21 PROCEDURE — 84484 ASSAY OF TROPONIN QUANT: CPT

## 2021-01-21 PROCEDURE — 80061 LIPID PANEL: CPT

## 2021-01-21 PROCEDURE — 96374 THER/PROPH/DIAG INJ IV PUSH: CPT

## 2021-01-21 PROCEDURE — 83540 ASSAY OF IRON: CPT

## 2021-01-21 PROCEDURE — 83615 LACTATE (LD) (LDH) ENZYME: CPT

## 2021-01-21 PROCEDURE — 83036 HEMOGLOBIN GLYCOSYLATED A1C: CPT

## 2021-01-21 PROCEDURE — 85652 RBC SED RATE AUTOMATED: CPT

## 2021-01-21 PROCEDURE — 36415 COLL VENOUS BLD VENIPUNCTURE: CPT

## 2021-01-21 PROCEDURE — 84145 PROCALCITONIN (PCT): CPT

## 2021-01-21 PROCEDURE — 71045 X-RAY EXAM CHEST 1 VIEW: CPT

## 2021-01-21 PROCEDURE — 85027 COMPLETE CBC AUTOMATED: CPT

## 2021-01-21 PROCEDURE — 85045 AUTOMATED RETICULOCYTE COUNT: CPT

## 2021-01-21 PROCEDURE — 86140 C-REACTIVE PROTEIN: CPT

## 2021-01-21 PROCEDURE — U0005: CPT

## 2021-01-21 PROCEDURE — 86803 HEPATITIS C AB TEST: CPT

## 2021-01-21 PROCEDURE — 84443 ASSAY THYROID STIM HORMONE: CPT

## 2021-01-21 PROCEDURE — 99285 EMERGENCY DEPT VISIT HI MDM: CPT

## 2021-01-21 PROCEDURE — 87635 SARS-COV-2 COVID-19 AMP PRB: CPT

## 2021-01-21 PROCEDURE — 83550 IRON BINDING TEST: CPT

## 2021-01-21 PROCEDURE — 85025 COMPLETE CBC W/AUTO DIFF WBC: CPT

## 2021-01-21 PROCEDURE — 85379 FIBRIN DEGRADATION QUANT: CPT

## 2021-01-21 PROCEDURE — 83735 ASSAY OF MAGNESIUM: CPT

## 2021-01-21 PROCEDURE — 80053 COMPREHEN METABOLIC PANEL: CPT

## 2021-01-21 PROCEDURE — 93005 ELECTROCARDIOGRAM TRACING: CPT

## 2021-01-21 PROCEDURE — 82607 VITAMIN B-12: CPT

## 2021-01-21 PROCEDURE — 82140 ASSAY OF AMMONIA: CPT

## 2025-01-15 NOTE — DISCHARGE NOTE NURSING/CASE MANAGEMENT/SOCIAL WORK - NSDCPEFALRISK_GEN_ALL_CORE
Since no care plan has been generated, I will be closing this case.       Patient information on fall and injury prevention